# Patient Record
Sex: MALE | Race: BLACK OR AFRICAN AMERICAN | NOT HISPANIC OR LATINO | Employment: FULL TIME | URBAN - METROPOLITAN AREA
[De-identification: names, ages, dates, MRNs, and addresses within clinical notes are randomized per-mention and may not be internally consistent; named-entity substitution may affect disease eponyms.]

---

## 2023-08-30 PROBLEM — K21.9 LPRD (LARYNGOPHARYNGEAL REFLUX DISEASE): Status: ACTIVE | Noted: 2023-08-30

## 2023-08-30 PROBLEM — R43.2 LOSS OF TASTE: Status: ACTIVE | Noted: 2023-08-30

## 2023-08-30 PROBLEM — A59.9 TRICHIMONIASIS: Status: ACTIVE | Noted: 2023-08-30

## 2023-08-30 PROBLEM — R09.A2 GLOBUS SENSATION: Status: ACTIVE | Noted: 2023-08-30

## 2023-08-30 PROBLEM — R13.10 DYSPHAGIA: Status: ACTIVE | Noted: 2023-08-30

## 2023-08-30 PROBLEM — R73.03 PRE-DIABETES: Status: ACTIVE | Noted: 2023-08-30

## 2023-08-30 PROBLEM — J95.830 POST-TONSILLECTOMY HEMORRHAGE: Status: ACTIVE | Noted: 2023-08-30

## 2023-08-30 PROBLEM — E03.9 HYPOTHYROIDISM: Status: ACTIVE | Noted: 2023-08-30

## 2023-08-30 PROBLEM — R43.0 LOSS OF SMELL: Status: ACTIVE | Noted: 2023-08-30

## 2023-08-30 PROBLEM — R73.09 ELEVATED GLUCOSE: Status: ACTIVE | Noted: 2023-08-30

## 2023-08-30 PROBLEM — A64 STI (SEXUALLY TRANSMITTED INFECTION): Status: ACTIVE | Noted: 2023-08-30

## 2023-08-30 PROBLEM — R55 SYNCOPE: Status: ACTIVE | Noted: 2023-08-30

## 2023-08-30 PROBLEM — K11.7 XEROSTOMIA DUE TO RADIOTHERAPY: Status: ACTIVE | Noted: 2023-08-30

## 2023-08-30 PROBLEM — E66.811 OBESITY, CLASS I, BMI 30-34.9: Status: ACTIVE | Noted: 2023-08-30

## 2023-08-30 PROBLEM — C78.00: Status: ACTIVE | Noted: 2023-08-30

## 2023-08-30 PROBLEM — E78.5 HYPERLIPIDEMIA: Status: ACTIVE | Noted: 2023-08-30

## 2023-08-30 PROBLEM — R91.1 PULMONARY NODULE: Status: ACTIVE | Noted: 2023-08-30

## 2023-08-30 PROBLEM — R10.9 ABDOMINAL PAIN: Status: ACTIVE | Noted: 2023-08-30

## 2023-08-30 PROBLEM — E88.89 STEATOSIS (MULTI): Status: ACTIVE | Noted: 2023-08-30

## 2023-08-30 PROBLEM — C32.1 MALIGNANT NEOPLASM OF SUPRAGLOTTIS (MULTI): Status: ACTIVE | Noted: 2023-08-30

## 2023-08-30 PROBLEM — R79.89 ELEVATED TSH: Status: ACTIVE | Noted: 2023-08-30

## 2023-08-30 PROBLEM — R49.0 HOARSENESS, PERSISTENT: Status: ACTIVE | Noted: 2023-08-30
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
## 2023-08-30 PROBLEM — S43.409A SPRAIN OF SHOULDER: Status: ACTIVE | Noted: 2023-08-30

## 2023-08-30 PROBLEM — J34.3 HYPERTROPHY OF NASAL TURBINATES: Status: ACTIVE | Noted: 2023-08-30

## 2023-08-30 PROBLEM — E87.6 HYPOKALEMIA: Status: ACTIVE | Noted: 2023-08-30

## 2023-08-30 PROBLEM — J03.91 RECURRENT TONSILLITIS: Status: ACTIVE | Noted: 2023-08-30

## 2023-08-30 PROBLEM — R06.83 SNORING: Status: ACTIVE | Noted: 2023-08-30

## 2023-08-30 PROBLEM — E66.9 OBESITY, CLASS I, BMI 30-34.9: Status: ACTIVE | Noted: 2023-08-30

## 2023-08-30 PROBLEM — C32.9 LARYNGEAL CANCER (MULTI): Status: ACTIVE | Noted: 2023-08-30

## 2023-08-30 RX ORDER — LEVOTHYROXINE SODIUM 75 UG/1
75 TABLET ORAL DAILY
COMMUNITY
Start: 2022-07-14

## 2023-10-03 ENCOUNTER — TELEPHONE (OUTPATIENT)
Dept: ADMISSION | Facility: HOSPITAL | Age: 53
End: 2023-10-03
Payer: COMMERCIAL

## 2023-10-03 NOTE — TELEPHONE ENCOUNTER
Pt called requesting an update on his [facility] paperwork that was given to the team. Message sent to Dr. Burkitt's team.

## 2023-10-04 ENCOUNTER — HOSPITAL ENCOUNTER (OUTPATIENT)
Dept: RADIOLOGY | Facility: HOSPITAL | Age: 53
Discharge: HOME | End: 2023-10-04
Payer: COMMERCIAL

## 2023-10-04 DIAGNOSIS — C32.1 MALIGNANT NEOPLASM OF SUPRAGLOTTIS (MULTI): ICD-10-CM

## 2023-10-04 PROCEDURE — 71260 CT THORAX DX C+: CPT

## 2023-10-04 PROCEDURE — 71260 CT THORAX DX C+: CPT | Performed by: STUDENT IN AN ORGANIZED HEALTH CARE EDUCATION/TRAINING PROGRAM

## 2023-10-04 PROCEDURE — 2550000001 HC RX 255 CONTRASTS: Performed by: INTERNAL MEDICINE

## 2023-10-04 RX ADMIN — IOHEXOL 75 ML: 350 INJECTION, SOLUTION INTRAVENOUS at 09:59

## 2023-10-06 ENCOUNTER — LAB (OUTPATIENT)
Dept: LAB | Facility: HOSPITAL | Age: 53
End: 2023-10-06
Payer: COMMERCIAL

## 2023-10-13 ENCOUNTER — OFFICE VISIT (OUTPATIENT)
Dept: HEMATOLOGY/ONCOLOGY | Facility: HOSPITAL | Age: 53
End: 2023-10-13
Payer: COMMERCIAL

## 2023-10-13 VITALS
BODY MASS INDEX: 29.41 KG/M2 | RESPIRATION RATE: 17 BRPM | OXYGEN SATURATION: 98 % | HEIGHT: 72 IN | DIASTOLIC BLOOD PRESSURE: 84 MMHG | HEART RATE: 57 BPM | WEIGHT: 217.15 LBS | TEMPERATURE: 97.2 F | SYSTOLIC BLOOD PRESSURE: 132 MMHG

## 2023-10-13 DIAGNOSIS — C32.9 LARYNGEAL CANCER (MULTI): Primary | ICD-10-CM

## 2023-10-13 DIAGNOSIS — Z08 ENCOUNTER FOR FOLLOW-UP SURVEILLANCE OF HEAD AND NECK CANCER: ICD-10-CM

## 2023-10-13 DIAGNOSIS — K11.7 XEROSTOMIA DUE TO RADIOTHERAPY: ICD-10-CM

## 2023-10-13 DIAGNOSIS — Z85.89 ENCOUNTER FOR FOLLOW-UP SURVEILLANCE OF HEAD AND NECK CANCER: ICD-10-CM
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
## 2023-10-13 DIAGNOSIS — C78.00 SECONDARY SQUAMOUS CELL CARCINOMA OF LUNG, UNSPECIFIED LATERALITY (MULTI): ICD-10-CM

## 2023-10-13 DIAGNOSIS — R91.8 MULTIPLE LUNG NODULES ON CT: ICD-10-CM

## 2023-10-13 PROCEDURE — 99215 OFFICE O/P EST HI 40 MIN: CPT | Performed by: STUDENT IN AN ORGANIZED HEALTH CARE EDUCATION/TRAINING PROGRAM

## 2023-10-13 PROCEDURE — 1036F TOBACCO NON-USER: CPT | Performed by: STUDENT IN AN ORGANIZED HEALTH CARE EDUCATION/TRAINING PROGRAM

## 2023-10-13 ASSESSMENT — PAIN SCALES - GENERAL: PAINLEVEL: 0-NO PAIN

## 2023-10-13 NOTE — PROGRESS NOTES
Patient ID: Tia Martinez is a 52 y.o. male.  Diagnosis:  Squamous Cell Carcinoma of supraglottic larynx, P16 Negative  Stagin/2019: cT3 cN0 cMx, Stage III   2020: T0 N0 M1 with mets to lung  Date of Diagnosis: 2019    Providers:  ENT Surgeon: Dr. Luna Mercer  [facility]: Dr. Rachelle Saldana --> Dr. Kyunghee Burkitt, X. Katherine Feng (PA)  RadOnc: Dr. Luigi Bhandari    Prior Therapy  2019 - 2020: Concurrent chemoradiation HD Cisplatin (100mg/m2) x1 and changed to weekly dosing with Cisplatin (40mg/m2) and 70gy RT in 35fx  10/5 - 10/15/2020: 50gy SBRT to left upper lobe lung nodule metastatic from Squamous Cell Carcinoma of Larynx.     Sites of Disease  DEE      ONCOLOGIC HISTORY  - 2019 Presented to Dr. Mercer  - Enrolled on the DRINK study. Cycle 1 of HD cisplatin was complicated by MIRTHA with Creat 2.3 due to dehydration. He received IVF 3x a week in [facility] the week after chemo with good improvement. When he presented for 2nd dose HD Cisplatin his ANC was below  parameters for treatment. Given low ANC and elevated Creatinine after HD Cis, he was changed to weekly dosing which he received on 19 when his counts recovered.   - 20 post treatment PET/CT showed Marked interval decrease in metabolic activity within the larynx consistent with response of patient's known squamous cell carcinoma to treatment. Mild increased  activity is seen in the area of the vocal cords which could be related to phonation versus residual tumor. Resolution of previously seen hypermetabolic cervical lymphadenopathy. Stable pulmonary nodule within the left upper lobe with mild metabolic activity,  nonspecific.      Lung nodule:   - 8/3/20 CT chest w/ contrast: showed interval increase in size of the pulmonary nodule in the left upper lobe, which now demonstrates central cavitation. Findings are concerning for potential metastatic disease.  - 20: CT guided lung biopsy that confirmed mets.   - 10/15/20: Completed  50 gy SBRT to nodules.    - 12/1/20 CT chest w/ con reviewed with Dr. Bhandari. There was  Interval increase in size of a left upper lobe cavitating pulmonary nodule with now abutment of the adjacent pleura. After discussion with Rad onc, it maybe radiation related fibrosis and it  was recommended for PET   - 1/11/21 PET/CT showed continued increase activity in left upper lobe mass w/ significant interval increase in size and hypermetabolic activity  - 3/1/21: Left upper lobe wedge resection. Pathology did not show evidence of cancer--only inflammation. Discussed the results with the  patient and plan for surveillance  - Surveillance CT Chest has been stable since     Past Medical History:   Past Medical History:  No date: Fracture of unspecified part of scapula, unspecified   shoulder, initial encounter for closed fracture      Comment:  Scapula fracture  10/19/2022: Personal history of malignant neoplasm of larynx      Comment:  History of malignant neoplasm of larynx   Surgical History:    Past Surgical History:   Procedure Laterality Date    CT GUIDED PERCUTANEOUS BIOPSY LUNG  8/25/2020    CT GUIDED PERCUTANEOUS BIOPSY LUNG 8/25/2020 [facility]    OTHER SURGICAL HISTORY  03/08/2021    Lung wedge resection    TONSILLECTOMY  11/15/2016    Tonsillectomy With Adenoidectomy      Family History:    Family History   Problem Relation Name Age of Onset    Heart attack Mother      Thyroid disease Mother      Diabetes Father      Lung cancer Father      Heart failure Brother      Diabetes Mother's Sister       Family Oncology History:    Cancer-related family history includes Lung cancer in his father.  Social History:    Social History     Tobacco Use    Smoking status: Never    Smokeless tobacco: Never   Substance Use Topics    Alcohol use: Never    Drug use: Never          Subjective   Chief Complaint: Squamous Cell Carcinoma of supraglottic larynx with mets to lung    HPI  Interval History  Tia Martinez is a 52 y.o.  male w/ hisotory of supraglottic larynx cancer, s/p CRT with HD Cisplatin then transition to weekly Cisplatin dosing after 1 cycle of HD Cis, completed  CRT on 1/30/20. Post treatment PET/CT showed good response in head & neck with mild metabolic activity in DEE. Later found to have lung mets confirmed by biopsy and completed SBRT to DEE lung mass in Oct 2020. However continued to have increased metabolic activity on PET/CT and completed DEE wedge resection on 3/1/21 with path showing only inflammation. Patient has since been followed with surveillance CT chest with contrast.      Patient is 2.5 years from last treatment, he presents today for follow up and review of surveillance CT Chest.     Patient reports he has been doing well.  He has some muscle tightness in his neck when he eats or yawn occasionally, this is chronic.  He continues to have dry mouth that is well managed with water, taste is back to normal. He has lost weight since last visit. Report his diet is still not very healthy.      Reviewed 10/4/23 CT Chest with patient and his wife in clinic today. Scans shows stable post surgical changes to DEE and stable scattered nodules in bilateral lungs. No new nodules or enlarge LN in chest.      ROS  Review of Systems   Constitutional:  Negative for chills and fever.   HENT:   Negative for hearing loss, lump/mass, mouth sores, nosebleeds, sore throat, tinnitus and trouble swallowing.    Respiratory:  Negative for cough and shortness of breath.    Cardiovascular:  Negative for chest pain and leg swelling.   Gastrointestinal:  Negative for abdominal pain, constipation, diarrhea, nausea and vomiting.   Musculoskeletal:  Negative for arthralgias.   Skin:  Negative for rash.   Neurological:  Negative for headaches, light-headedness and numbness.       Allergies  No Known Allergies     Medications  Current Outpatient Medications   Medication Instructions    levothyroxine (SYNTHROID, LEVOXYL) 75 mcg, oral, Daily,  "Take on empty stomach          Objective   VS: /84   Pulse 57   Temp 36.2 °C (97.2 °F)   Resp 17   Ht 1.829 m (6' 0.01\")   Wt 98.5 kg (217 lb 2.5 oz)   SpO2 98%   BMI 29.44 kg/m²   Weight:   Wt Readings from Last 5 Encounters:   10/13/23 98.5 kg (217 lb 2.5 oz)   05/17/23 103 kg (227 lb 4 oz)   01/19/23 104 kg (228 lb 8 oz)   01/18/23 103 kg (226 lb)   10/19/22 101 kg (222 lb)         Physical Exam  Constitutional:       Appearance: Normal appearance. He is well-developed.   HENT:      Head: Normocephalic and atraumatic.      Right Ear: External ear normal. No tenderness.      Left Ear: External ear normal. No tenderness.      Nose: Nose normal.      Mouth/Throat:      Mouth: Mucous membranes are moist. No injury or oral lesions.      Tongue: No lesions.      Pharynx: Oropharynx is clear.   Eyes:      Extraocular Movements: Extraocular movements intact.      Conjunctiva/sclera: Conjunctivae normal.      Pupils: Pupils are equal, round, and reactive to light.   Neck:      Thyroid: No thyroid mass.   Cardiovascular:      Rate and Rhythm: Normal rate and regular rhythm.   Pulmonary:      Effort: Pulmonary effort is normal. No respiratory distress.      Breath sounds: Normal breath sounds.   Abdominal:      General: Bowel sounds are normal. There is no distension or abdominal bruit.      Palpations: Abdomen is soft. There is no mass.      Tenderness: There is no abdominal tenderness.   Musculoskeletal:         General: Normal range of motion.      Cervical back: Normal range of motion and neck supple. No signs of trauma. Normal range of motion.      Right lower leg: No edema.      Left lower leg: No edema.   Lymphadenopathy:      Cervical: No cervical adenopathy.      Upper Body:      Right upper body: No axillary adenopathy.      Left upper body: No axillary adenopathy.   Skin:     General: Skin is warm and dry.      Findings: No lesion or rash.   Neurological:      General: No focal deficit present.      " Mental Status: He is alert and oriented to person, place, and time.      Gait: Gait is intact.   Psychiatric:         Mood and Affect: Mood and affect normal.         Behavior: Behavior is cooperative.           Diagnostic Results     Labs  Labs from 9/5/23 reviewed and are at baseline.                          Image  10/4/2023 CT chest w IV contrast  Impression:   1.  Evolving postsurgical/post radiation changes of the upper lobe, with mild decrease in conspicuity of soft tissue nodularity along the surgical suture margins as described above, when compared to recent prior CT from 04/10/2023. Continued attention on follow-up imaging is recommended.   2. Few small bilateral noncalcified pulmonary nodules, which are stable dating back to 10/01/2021. No new or enlarging pulmonary nodules.  3. Additional stable findings as above.        Assessment/Plan   ASSESSMENT  Tia Martinez is a 52 y.o. male w/ stage III cT3 cN0 cMx supraglottic larynx cancer, PDL-1 CPS 5 receiving definitive chemoradiation with high dose cisplatin 100mg/m2  started on 12/11/19. Transition to weekly Cisplatin dosing after 1 cycle of HD Cis, completed CRT on 1/30/20. Enrolled on DRINK study, AKA Gatorade study. Post treatment PET/CT showed good response in head & neck with mild metabolic activity in DEE. Later  found to have DEE lung mets confirmed by biopsy and completed SBRT to DEE lung mass on 10/15/20. However continued to have increased metabolic activity on PET/CT and completed DEE wedge resection  on 3/1/21 with path showing only inflammation. Patient has since been followed with surveillance CT chest with contrast.      # Supraglottic larynx cancer with mets to lung  - Reviewed 4/10/23 CT Chest with patient, continue to show stability in lung nodules. He's eating/drinking with no issues, he's in his usual state of health. He will have surveillance scan again in 6 months.     # Steatosis  - Notes on 10/10/22 CT Chest. Patient endorse eating  greasy meals, drinking 12 pack of beer  - Advised pt to establish PCP to discuss lifestyle modifications decrease high fat food and incorporate vegetables and protein in to diet. Decrease alcohol intake.  - 10/12/23: Patient has loss weight but continue to endorse not eating very health and still has not established with a PCP. Encouraged patient to go on 's website to schedule with a PCP online.      PLAN  -- CT chest w/ contrast in 6 months on 4/5/24  -- RTC with [facility] on 4/9/24  -- Continue follow up with Dr. Mercer (ENT), next FUV 1/10/24  -- Patient to establish with a PCP

## 2023-10-13 NOTE — LETTER
October 13, 2023     Patient: Tia Martinez   YOB: 1970   Date of Visit: 10/13/2023       To Whom It May Concern:    Tia Martinez was seen in my clinic on 10/13/2023. Please excuse Tia for his absence from work on this day to make the appointment.    If you have any questions or concerns, please don't hesitate to call.         Sincerely,         Yair Novoa PA-C        CC: No Recipients

## 2023-10-16 ASSESSMENT — ENCOUNTER SYMPTOMS
LEG SWELLING: 0
SHORTNESS OF BREATH: 0
HEADACHES: 0
COUGH: 0
TROUBLE SWALLOWING: 0
ABDOMINAL PAIN: 0
SORE THROAT: 0
NAUSEA: 0
VOMITING: 0
DIARRHEA: 0
LIGHT-HEADEDNESS: 0
FEVER: 0
CHILLS: 0
ARTHRALGIAS: 0
NUMBNESS: 0
CONSTIPATION: 0

## 2023-11-30 ENCOUNTER — HOSPITAL ENCOUNTER (EMERGENCY)
Facility: HOSPITAL | Age: 53
Discharge: HOME | End: 2023-11-30
Payer: COMMERCIAL

## 2023-11-30 ENCOUNTER — APPOINTMENT (OUTPATIENT)
Dept: RADIOLOGY | Facility: HOSPITAL | Age: 53
End: 2023-11-30
Payer: COMMERCIAL

## 2023-11-30 VITALS
RESPIRATION RATE: 18 BRPM | OXYGEN SATURATION: 98 % | HEART RATE: 59 BPM | SYSTOLIC BLOOD PRESSURE: 121 MMHG | WEIGHT: 224 LBS | HEIGHT: 72 IN | TEMPERATURE: 97.9 F | BODY MASS INDEX: 30.34 KG/M2 | DIASTOLIC BLOOD PRESSURE: 86 MMHG

## 2023-11-30 DIAGNOSIS — M25.532 WRIST PAIN, ACUTE, LEFT: Primary | ICD-10-CM

## 2023-11-30 PROCEDURE — 99283 EMERGENCY DEPT VISIT LOW MDM: CPT | Mod: 25

## 2023-11-30 PROCEDURE — 99284 EMERGENCY DEPT VISIT MOD MDM: CPT

## 2023-11-30 PROCEDURE — 73110 X-RAY EXAM OF WRIST: CPT | Mod: LT

## 2023-11-30 PROCEDURE — 2500000004 HC RX 250 GENERAL PHARMACY W/ HCPCS (ALT 636 FOR OP/ED): Performed by: PHYSICIAN ASSISTANT

## 2023-11-30 PROCEDURE — 73110 X-RAY EXAM OF WRIST: CPT | Mod: LEFT SIDE | Performed by: RADIOLOGY

## 2023-11-30 PROCEDURE — 96372 THER/PROPH/DIAG INJ SC/IM: CPT

## 2023-11-30 RX ORDER — CYCLOBENZAPRINE HCL 10 MG
10 TABLET ORAL 2 TIMES DAILY PRN
Qty: 20 TABLET | Refills: 0 | Status: SHIPPED | OUTPATIENT
Start: 2023-11-30 | End: 2023-12-10

## 2023-11-30 RX ORDER — NAPROXEN 500 MG/1
500 TABLET ORAL
Qty: 30 TABLET | Refills: 0 | Status: SHIPPED | OUTPATIENT
Start: 2023-11-30 | End: 2023-12-15

## 2023-11-30 RX ORDER — KETOROLAC TROMETHAMINE 30 MG/ML
30 INJECTION, SOLUTION INTRAMUSCULAR; INTRAVENOUS ONCE
Status: COMPLETED | OUTPATIENT
Start: 2023-11-30 | End: 2023-11-30

## 2023-11-30 RX ADMIN — KETOROLAC TROMETHAMINE 30 MG: 30 INJECTION, SOLUTION INTRAMUSCULAR at 12:15

## 2023-11-30 ASSESSMENT — PAIN SCALES - GENERAL: PAINLEVEL_OUTOF10: 7

## 2023-11-30 ASSESSMENT — COLUMBIA-SUICIDE SEVERITY RATING SCALE - C-SSRS
2. HAVE YOU ACTUALLY HAD ANY THOUGHTS OF KILLING YOURSELF?: NO
6. HAVE YOU EVER DONE ANYTHING, STARTED TO DO ANYTHING, OR PREPARED TO DO ANYTHING TO END YOUR LIFE?: NO
1. IN THE PAST MONTH, HAVE YOU WISHED YOU WERE DEAD OR WISHED YOU COULD GO TO SLEEP AND NOT WAKE UP?: NO

## 2023-11-30 ASSESSMENT — PAIN - FUNCTIONAL ASSESSMENT: PAIN_FUNCTIONAL_ASSESSMENT: 0-10

## 2023-11-30 NOTE — ED PROVIDER NOTES
EMERGENCY MEDICINE EVALUATION NOTE    History of Present Illness     Chief Complaint:   Chief Complaint   Patient presents with    Wrist Pain       HPI: Tia Martinez is a 53 y.o. male presents with a chief complaint of left wrist pain.  Patient states that he has had pain with movement ever since yesterday.  Patient reports that he has decreased flexion and extension secondary to pain.  Reports no significant injury.  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  Patient has a previous surgeries or fractures.  Patient states that the pain starts in his elbow and radiates down the wrist.  Patient denies any loss of neurovascular status to the left upper extremity.  Patient has not been taking anything at home for symptoms.  Patient denies any associated chest pain or shortness of breath.  Patient denies any significant swelling of the left upper extremity denies any redness or warmth to left wrist joint.    Previous History     Past Medical History:   Diagnosis Date    Fracture of unspecified part of scapula, unspecified shoulder, initial encounter for closed fracture     Scapula fracture    Personal history of malignant neoplasm of larynx 10/19/2022    History of malignant neoplasm of larynx     Past Surgical History:   Procedure Laterality Date    CT GUIDED PERCUTANEOUS BIOPSY LUNG  8/25/2020    CT GUIDED PERCUTANEOUS BIOPSY LUNG 8/25/2020 [facility]    OTHER SURGICAL HISTORY  03/08/2021    Lung wedge resection    TONSILLECTOMY  11/15/2016    Tonsillectomy With Adenoidectomy     Social History     Tobacco Use    Smoking status: Never    Smokeless tobacco: Never   Substance Use Topics    Alcohol use: Never    Drug use: Never     Family History   Problem Relation Name Age of Onset    Heart attack Mother      Thyroid disease Mother      Diabetes Father      Lung cancer Father      Heart  failure Brother      Diabetes Mother's Sister       No Known Allergies  Current Outpatient Medications   Medication Instructions    cyclobenzaprine (FLEXERIL) 10 mg, oral, 2 times daily PRN    levothyroxine (SYNTHROID, LEVOXYL) 75 mcg, oral, Daily, Take on empty stomach    naproxen (NAPROSYN) 500 mg, oral, 2 times daily with meals       Physical Exam     Appearance: Alert, oriented , cooperative     Skin: Intact,  dry skin, no lesions, rash, petechiae or purpura.      Eyes: PERRLA, EOMs intact,  Conjunctiva pink      ENT: Hearing grossly intact.      Neck: Supple. Trachea at midline.      Pulmonary: Clear bilaterally. No rales, rhonchi or wheezing. No accessory muscle use or stridor.     Cardiac: Normal rate and rhythm without murmur     Abdomen: Soft, nontender, active bowel sounds.     Musculoskeletal: Decreased range of motion of left wrist.  No obvious tenderness on examination.  Unable to perform Phalen's test due to pain.  Neurovascular intact in left upper extremity with strong pulses and prescribe refill.     Neurological:Cranial nerves II through XII are grossly intact, normal sensation, no weakness, no focal findings identified.     Results   Labs Reviewed - No data to display  XR wrist left 3+ views   Final Result   1. No evidence of acute fracture or dislocation.        MACRO:   None.        Signed by: Phong Adair 11/30/2023 12:14 PM   Dictation workstation:   [id number]            ED Course & Medical Decision Making     Medications   ketorolac (Toradol) injection 30 mg (30 mg intramuscular Given 11/30/23 1215)     Heart Rate:  [59]   Temp:  [36.6 °C (97.9 °F)]   Resp:  [18]   BP: (121)/(86)   Height:  [182.9 cm (6')]   Weight:  [102 kg (224 lb)]   SpO2:  [98 %]    Diagnoses as of 11/30/23 1310   Wrist pain, acute, left     Tia Martinez is a 53 y.o. male presents with a chief complaint of left wrist pain.  Patient states that he has had pain with movement ever since yesterday.  Patient reports that  he has decreased flexion and extension secondary to pain.  Reports no significant injury.  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  Patient has a previous surgeries or fractures.  Patient states that the pain starts in his elbow and radiates down the wrist.  Patient denies any loss of neurovascular status to the left upper extremity.  Patient has not been taking anything at home for symptoms.  Patient denies any associated chest pain or shortness of breath.  Patient had work-up today which included x-rays.  X-rays did not show any acute fracture dislocations of the left wrist.  Plan of care discussed with the patient.  Patient did receive IM Toradol in the ED which did improve his symptoms.  Patient be discharged home at this time with naproxen and Flexeril at home for his pain.  Patient requested light duty work note for the next 2 days which she was given.  He was encouraged to follow-up with primary care provider or return here immediately with any worsening symptoms.    Procedures   Procedures    Diagnosis     1. Wrist pain, acute, left        Disposition   Discharged    ED Prescriptions       Medication Sig Dispense Start Date End Date Auth. Provider    naproxen (Naprosyn) 500 mg tablet Take 1 tablet (500 mg) by mouth 2 times a day with meals for 15 days. 30 tablet 11/30/2023 12/15/2023 James Carrillo PA-C    cyclobenzaprine (Flexeril) 10 mg tablet Take 1 tablet (10 mg) by mouth 2 times a day as needed for muscle spasms for up to 10 days. 20 tablet 11/30/2023 12/10/2023 James Carrillo PA-C            Disclaimer: This note was dictated by speech recognition. Minor errors in transcription may be present. Please call if questions.       James Carrillo PA-C  11/30/23 [id number]

## 2023-11-30 NOTE — ED TRIAGE NOTES
Pt arrived via private vehicle from home with chief c/o of L wrist pain. 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 Pt states he has decreased ROM and his hand feels numb, he cannot bend wrist. Pt used icy hot at home with no relief.

## 2023-11-30 NOTE — Clinical Note
Tia Martinez was seen and treated in our emergency department on 11/30/2023.  He may return to work on 12/04/2023.  Please allow patient to have a few days with light duty.     If you have any questions or concerns, please don't hesitate to call.      James Carrillo PA-C

## 2024-01-02 NOTE — PROGRESS NOTES
Cancer follow up  TSH: Due now  Chest: Due 4/24    Chief Complaint   Patient presents with    Follow-up     HPI:  Tia Martinez is a 53 y.o. 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 He completed chemo/xrt on 1/30/2020. He completed lung SBRT on 10/15/20 with Dr. Bhandari. He then underwent left VATS which was negative for persistent cancer. Last seen 9/23. No breathing issues. Maintains a good diet. No weight loss. His voice remains raspy. No neck masses.  Due for TSH today.  Mild xerostomia, mild dysphagia.  Denies odynophagia or otalgia. Tolerating a regular diet.  Denies weight loss.  No fevers/chills.  No night sweats.    History:  Dx: T3N0M0 SCCa of the right supraglottis  Dx2: T0N0M1 SCCa of the left lung s/p SBRT 2020  10/19: Hoarseness & dysphagia, scope exam with supraglottic mass  10/30/19: +right supraglottic mass with paraglottic space involvement, no pathologic lymphadenopathy  10/30/19: MBS w/speech +dysphagia with aspiration  11/1/19: S/p triple endoscopy and biopsy +SCCa  11/21/19: CT chest w/o contrast which shows several bilateral pulmonary nodules. A solid 10 mm solid pulmonary  nodule at the lateral aspect of the right upper lobe is concerning for metastatic disease. Further evaluation and attention on short-term follow-up is recommended.   11/27/19: PET/CT which shows thickening of the right vocal cord with significantly increased hypermetabolic activity (max SUV 14.8) extending into the epiglottis with (max SUV 5.3). There is a 0.7 cm right cervical chain zone 2B lymph node with mild hypermetabolic activity (max SUV 3.5). Left upper lobe nodular density measuring 0.8 cm shows mild  hypermetabolic activity (max SUV 2.2).  12/11/19: Started chemo/xrt with Dr. Saldana and Dr. Bhandari   1/30/20: Completed chemo/xrt   4/30/20: 3 month post treatment PET and repeat CT chest shows excellent response with mild FDG avidity at the larynx specifically the  vocal folds, resolution of his cervical lymphadenopathy, stable 7mm left lung nodule.  6/24/20: TSH 2.15  7/17/20: +COVID   8/3/20: Repeat CT chest this shows a left upper lobe nodule which demonstrates interval increase in size and central cavitation. The nodule now measures 1.5 x 1.3 cm, was 0.8 x 0.7 cm. There are multiple additional pulmonary nodules  which are stable in size and appearance compared to the prior exam. For instance, there is a pleural-based nodule in the right lower lobe measuring 5 mm -4 mm nodule in the right lower lobe.   8/25/20: CT guided lung biopsy of the left lung. Path + for SCCa  9/4/20: PET scan +lung nodule   10/15/20: Completed SBRT to L lung with Dr. Bhandari  12/1/20: CT chest which shows interval increase in the size of the left upper lobe lung nodule with now abutment of the adjacent pleura.   1/11/21: PET scan with increase in the DEE lesion, SUV~5, laryngeal SUV5 possibly physiologic  3/1/21: S/p L VATs DEE wedge resection, mediastinal LN dissection, bronch with Dr. Rankin. Path negative for cancer and + reactive changes from prior treatment.   6/10/21: CT chest w/ contrast- stable, no new nodules  1/12/22: CT chest - stable, no new nodules  4/22: CT chest - stable, no new nodules  4/22: TSH- hypothyroid at 5.51.   10/22: TSH 4.8 but T4 normal  10/22: CT chest - stable, no new nodules  1/18: Weight stable  4/11/23: CT chest- stable, no new nodules  10/23: CT chest - stable, no new nodules     SH:  Tob: +current smoker. Quit   ETOH: Moderate/social   Here with wife    ROS:  Review of Systems   Constitutional:  Negative for appetite change, chills, fatigue, fever and unexpected weight change.   HENT:  Negative for dental problem, drooling, ear pain, facial swelling, hearing loss, mouth sores, sore throat, tinnitus, trouble swallowing and voice change.    Respiratory:  Negative for cough, shortness of breath and stridor.    Gastrointestinal:  Negative for nausea and vomiting.    Musculoskeletal:  Negative for neck pain.   Hematological:  Negative for adenopathy.   All other systems reviewed and are negative.     PE:  ENT Physical Exam  Constitutional  Appearance: patient appears well-developed, patient is cooperative;  Communication/Voice: Communication comments: +hoarseness (stable), +breathy (stable)  Head and Face  Appearance: head appears normal and face appears normal;  Salivary: glands normal;  Ear  Auricles: right auricle normal; left auricle normal;  Ear Canals: right ear canal normal; left ear canal normal;  Tympanic Membranes: right tympanic membrane normal;  Ear comments: Left TM with small area of weakness but intact  Nose  External Nose: nares patent bilaterally; external nose normal;  Internal Nose: nasal mucosa normal; septum normal;  Oral Cavity/Oropharynx  Lips: normal;  Gums: gingiva normal;  Tongue: normal;  Oral mucosa: normal;  OC/OP comments: Uvula deviated to the right s/p tonsillectomy with scars present from tonsillectomy bilaterally  Neck  Neck: radiation changes present;  Thyroid: thyroid normal;  Respiratory  Inspection: breathing unlabored;  Cardiovascular  Inspection: extremities are warm and well perfused;  Neurovestibular  Mental Status: alert and oriented;  Psychiatric: mood normal; affect is appropriate;  Cranial Nerves: cranial nerves intact;       Procedures   PROCEDURE NOTE:  Recommended flexible nasopharyngoscopy.  Risks, benefits, personnel and alternatives were explained.  The patient wished to proceed.  S/he was re-identified.  PROCEDURE:  Flexible nasopharyngoscopy  PREOPERATIVE DIAGNOSIS: Laryngeal cancer surveillance  POSTOPERATIVE DIAGNOSIS: Retroflexed epiglottis, +anterior glottic web, no recurrent masses  INDICATIONS: Inability to tolerate mirror exam  PROCEDURE NOTE:  Recommended flexible nasopharyngoscopy.  Risks, benefits, personnel and alternatives were explained.  The patient wished to proceed.  S/he was re-identified.  FINDINGS:  The  nasopharynx and oropharynx were normal without any lesions or masses visualized.  No masses or lesions were visualized at the base of tongue, vallecula, epiglottis, aryepiglottic folds, pyriform sinuses, and lateral pharyngeal walls.  See findings above.  True vocal fold movement was normal.  The patient's airway was widely patent with no evidence of obstruction.  Patient tolerated the procedure well, and there were no complications.     ASSESSMENT AND PLAN:  Problem List Items Addressed This Visit       Dysphagia    Current Assessment & Plan     Chronic, adverse effect of radiation  Continue current diet         Hoarseness, persistent    Hypothyroidism - Primary    Current Assessment & Plan     TSH due today, ordered will call with results         Relevant Orders    Thyroid Stimulating Hormone    Laryngeal cancer (CMS/HCC)    Malignant neoplasm of supraglottis (CMS/HCC)    Current Assessment & Plan     No evidence of disease in the larynx  Endoscopy negative today  Follow up in 6 months  Hoarseness is stable  Dysphagia is stable         Metastatic squamous cell carcinoma to lung (CMS/HCC)    Current Assessment & Plan     Managed by med onc  Stable CT chest 10/23  Plan for repeat CT chest 4/23         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 Luna Mercer MD.     All medical record entries made by the Scribe were at my direction and personally dictated by me, Dr. Luna Mercer. I have reviewed the chart and agree that the record accurately reflects my personal performance of the history, physical exam, discussion and plan.

## 2024-01-10 ENCOUNTER — OFFICE VISIT (OUTPATIENT)
Dept: OTOLARYNGOLOGY | Facility: HOSPITAL | Age: 54
End: 2024-01-10
Payer: COMMERCIAL

## 2024-01-10 VITALS — TEMPERATURE: 97.3 F | BODY MASS INDEX: 28.39 KG/M2 | WEIGHT: 209.6 LBS | HEIGHT: 72 IN

## 2024-01-10 DIAGNOSIS — E03.9 ACQUIRED HYPOTHYROIDISM: Primary | ICD-10-CM

## 2024-01-10 DIAGNOSIS — C32.1 MALIGNANT NEOPLASM OF SUPRAGLOTTIS (MULTI): ICD-10-CM

## 2024-01-10 DIAGNOSIS — C32.9 LARYNGEAL CANCER (MULTI): ICD-10-CM
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
## 2024-01-10 DIAGNOSIS — K11.7 XEROSTOMIA DUE TO RADIOTHERAPY: ICD-10-CM

## 2024-01-10 DIAGNOSIS — C78.00 SECONDARY SQUAMOUS CELL CARCINOMA OF LUNG, UNSPECIFIED LATERALITY (MULTI): ICD-10-CM

## 2024-01-10 DIAGNOSIS — R13.12 OROPHARYNGEAL DYSPHAGIA: ICD-10-CM

## 2024-01-10 DIAGNOSIS — R49.0 HOARSENESS, PERSISTENT: ICD-10-CM

## 2024-01-10 PROBLEM — J03.91 RECURRENT TONSILLITIS: Status: RESOLVED | Noted: 2023-08-30 | Resolved: 2024-01-10

## 2024-01-10 PROBLEM — J95.830 POST-TONSILLECTOMY HEMORRHAGE: Status: RESOLVED | Noted: 2023-08-30 | Resolved: 2024-01-10

## 2024-01-10 PROCEDURE — 92511 NASOPHARYNGOSCOPY: CPT | Performed by: OTOLARYNGOLOGY

## 2024-01-10 PROCEDURE — 1036F TOBACCO NON-USER: CPT | Performed by: OTOLARYNGOLOGY

## 2024-01-10 PROCEDURE — 99214 OFFICE O/P EST MOD 30 MIN: CPT | Performed by: OTOLARYNGOLOGY

## 2024-01-10 RX ORDER — ACETAMINOPHEN 500 MG
1000 TABLET ORAL EVERY 6 HOURS PRN
COMMUNITY
Start: 2023-09-06

## 2024-01-10 ASSESSMENT — ENCOUNTER SYMPTOMS
APPETITE CHANGE: 0
VOICE CHANGE: 0
CHILLS: 0
NAUSEA: 0
ADENOPATHY: 0
COUGH: 0
FATIGUE: 0
SHORTNESS OF BREATH: 0
NECK PAIN: 0
TROUBLE SWALLOWING: 0
UNEXPECTED WEIGHT CHANGE: 0
STRIDOR: 0
FEVER: 0
FACIAL SWELLING: 0
VOMITING: 0
SORE THROAT: 0

## 2024-01-10 ASSESSMENT — PATIENT HEALTH QUESTIONNAIRE - PHQ9
SUM OF ALL RESPONSES TO PHQ9 QUESTIONS 1 & 2: 0
1. LITTLE INTEREST OR PLEASURE IN DOING THINGS: NOT AT ALL
2. FEELING DOWN, DEPRESSED OR HOPELESS: NOT AT ALL

## 2024-01-10 NOTE — ASSESSMENT & PLAN NOTE
No evidence of disease in the larynx  Endoscopy negative today  Follow up in 6 months  Hoarseness is stable  Dysphagia is stable

## 2024-01-25 ENCOUNTER — LAB (OUTPATIENT)
Dept: LAB | Facility: LAB | Age: 54
End: 2024-01-25
Payer: COMMERCIAL

## 2024-01-25 ENCOUNTER — OFFICE VISIT (OUTPATIENT)
Dept: PRIMARY CARE | Facility: CLINIC | Age: 54
End: 2024-01-25
Payer: COMMERCIAL

## 2024-01-25 VITALS
BODY MASS INDEX: 28.7 KG/M2 | TEMPERATURE: 97.9 F | DIASTOLIC BLOOD PRESSURE: 76 MMHG | HEART RATE: 60 BPM | OXYGEN SATURATION: 97 % | SYSTOLIC BLOOD PRESSURE: 115 MMHG | WEIGHT: 211.9 LBS | HEIGHT: 72 IN

## 2024-01-25 DIAGNOSIS — E03.9 ACQUIRED HYPOTHYROIDISM: ICD-10-CM

## 2024-01-25 DIAGNOSIS — Z11.3 SCREENING FOR STD (SEXUALLY TRANSMITTED DISEASE): ICD-10-CM

## 2024-01-25 DIAGNOSIS — Z11.3 SCREENING FOR STD (SEXUALLY TRANSMITTED DISEASE): Primary | ICD-10-CM

## 2024-01-25 LAB
HERPES SIMPLEX VIRUS 1 IGG: 7.1 INDEX
HERPES SIMPLEX VIRUS 2 IGG: >8 INDEX
HIV 1+2 AB+HIV1 P24 AG SERPL QL IA: NONREACTIVE
TREPONEMA PALLIDUM IGG+IGM AB [PRESENCE] IN SERUM OR PLASMA BY IMMUNOASSAY: NONREACTIVE
TSH SERPL-ACNC: 3.92 MIU/L (ref 0.44–3.98)

## 2024-01-25 PROCEDURE — 86696 HERPES SIMPLEX TYPE 2 TEST: CPT

## 2024-01-25 PROCEDURE — 86780 TREPONEMA PALLIDUM: CPT

## 2024-01-25 PROCEDURE — 86695 HERPES SIMPLEX TYPE 1 TEST: CPT

## 2024-01-25 PROCEDURE — 86694 HERPES SIMPLEX NES ANTBDY: CPT

## 2024-01-25 PROCEDURE — 1036F TOBACCO NON-USER: CPT

## 2024-01-25 PROCEDURE — 87389 HIV-1 AG W/HIV-1&-2 AB AG IA: CPT

## 2024-01-25 PROCEDURE — 84443 ASSAY THYROID STIM HORMONE: CPT

## 2024-01-25 PROCEDURE — 36415 COLL VENOUS BLD VENIPUNCTURE: CPT

## 2024-01-25 PROCEDURE — 99213 OFFICE O/P EST LOW 20 MIN: CPT

## 2024-01-25 ASSESSMENT — ENCOUNTER SYMPTOMS
DEPRESSION: 0
OCCASIONAL FEELINGS OF UNSTEADINESS: 0
LOSS OF SENSATION IN FEET: 0

## 2024-01-25 ASSESSMENT — PAIN SCALES - GENERAL: PAINLEVEL: 0-NO PAIN

## 2024-01-25 NOTE — PROGRESS NOTES
Subjective   Patient ID: Tia Martinez is a 53 y.o. male who presents for Appointment Request (Request STD test). Pt accompanied by his wife.     HPI     #STD test   - Wife was tested positive for HSV type 2 (was c/o itching and scar in the genital area)   - Pt denies any sx (no itching, discharge, pain)   - Last STD test 5 years ago, was negative  - Does not use condom   - Sexually active with his wife, denies other partner     Review of Systems  12pt ROS negative except as mentioned in HPI    Objective   /76 (BP Location: Right arm, Patient Position: Sitting, BP Cuff Size: Adult)   Pulse 60   Temp 36.6 °C (97.9 °F) (Temporal)   Ht 1.829 m (6')   Wt 96.1 kg (211 lb 14.4 oz)   SpO2 97%   BMI 28.74 kg/m²     Physical Exam  Constitutional:       Appearance: Normal appearance.   Cardiovascular:      Rate and Rhythm: Normal rate.   Pulmonary:      Effort: Pulmonary effort is normal.   Neurological:      Mental Status: He is oriented to person, place, and time.     Assessment/Plan     Tia Martinez is a 53 y.o. male who presents for STD screening, accompanied by his wife. Wife was recently positive for HSV 2. Pt is currently asymptomatic and denies previous genital sores/ulcer or other symptoms.     #STD screenig ordered   - Counseled safe sex practice and recommended use of condom  - Discussed the course of HSV and little utility of HSV testing but patient requested it  - G/C test which was not covered by insurance)    Problem List Items Addressed This Visit    None  Visit Diagnoses         Codes    Screening for STD (sexually transmitted disease)    -  Primary Z11.3    Relevant Orders    Syphilis Screen with Reflex    HIV 1/2 Antigen/Antibody Screen with Reflex to Confirmation    HSV Type I/II IgM Antibody    HSV1 IgG and HSV2 IgG          Discussed with Dr Donal Wilks MD  Family Medicine PGY2

## 2024-01-31 LAB — HSV1+2 IGM SER IA-ACNC: 0.48 IV

## 2024-02-07 NOTE — PROGRESS NOTES
I reviewed the resident/fellow's documentation and discussed the patient with the resident/fellow. I agree with the resident/fellow's medical decision making as documented in their note with the exception/addition of the following:  Herpes simplex serology:  Negative IGM;  positive IGG for HSV1 and HSV2, indicating history of infection with both types of herpes simplex at some time in his life, not within past month.     Mayra Mansfield MD

## 2024-04-04 DIAGNOSIS — R91.8 OTHER NONSPECIFIC ABNORMAL FINDING OF LUNG FIELD: Primary | ICD-10-CM

## 2024-04-05 ENCOUNTER — HOSPITAL ENCOUNTER (OUTPATIENT)
Dept: RADIOLOGY | Facility: HOSPITAL | Age: 54
Discharge: HOME | End: 2024-04-05
Payer: COMMERCIAL

## 2024-04-05 DIAGNOSIS — R91.8 MULTIPLE LUNG NODULES ON CT: ICD-10-CM

## 2024-04-05 DIAGNOSIS — C32.9 LARYNGEAL CANCER (MULTI): ICD-10-CM

## 2024-04-05 PROCEDURE — 2550000001 HC RX 255 CONTRASTS: Mod: SE | Performed by: STUDENT IN AN ORGANIZED HEALTH CARE EDUCATION/TRAINING PROGRAM

## 2024-04-05 PROCEDURE — 71260 CT THORAX DX C+: CPT

## 2024-04-05 PROCEDURE — 71260 CT THORAX DX C+: CPT | Performed by: RADIOLOGY

## 2024-04-05 RX ADMIN — IOHEXOL 75 ML: 350 INJECTION, SOLUTION INTRAVENOUS at 11:13

## 2024-04-08 ASSESSMENT — ENCOUNTER SYMPTOMS
TROUBLE SWALLOWING: 0
CONSTIPATION: 0
LIGHT-HEADEDNESS: 0
SORE THROAT: 0
VOMITING: 0
SHORTNESS OF BREATH: 0
LEG SWELLING: 0
ABDOMINAL PAIN: 0
NUMBNESS: 0
CHILLS: 0
FEVER: 0
COUGH: 0
ARTHRALGIAS: 0
HEADACHES: 0
NAUSEA: 0
DIARRHEA: 0

## 2024-04-08 NOTE — PROGRESS NOTES
Patient ID: Tia Maritnez is a 53 y.o. male.  Diagnosis:  Squamous Cell Carcinoma of supraglottic larynx, P16 Negative  Stagin/2019: cT3 cN0 cMx, Stage III   2020: T0 N0 M1 with mets to lung  Date of Diagnosis: 2019    Providers:  ENT Surgeon: Dr. Luna Mercer  MedOn: Dr. Rachelle Saldana --> Dr. Kyunghee Burkitt, X. Katherine Feng ([place])  RadOnc: Dr. Luigi Bhandari    Prior Therapy  2019 - 2020: Concurrent chemoradiation HD Cisplatin (100mg/m2) x1 and changed to weekly dosing with Cisplatin (40mg/m2) and 70gy RT in 35fx  10/5 - 10/15/2020: 50gy SBRT to left upper lobe lung nodule metastatic from Squamous Cell Carcinoma of Larynx.     Sites of Disease  DEE      ONCOLOGIC HISTORY  - 2019 Presented to Dr. Mercer  - Enrolled on the DRINK study. Cycle 1 of HD cisplatin was complicated by MIRTHA with Creat 2.3 due to dehydration. He received IVF 3x a week in [facility] the week after chemo with good improvement. When he presented for 2nd dose HD Cisplatin his ANC was below  parameters for treatment. Given low ANC and elevated Creatinine after HD Cis, he was changed to weekly dosing which he received on 19 when his counts recovered.   - 20 post treatment PET/CT showed Marked interval decrease in metabolic activity within the larynx consistent with response of patient's known squamous cell carcinoma to treatment. Mild increased  activity is seen in the area of the vocal cords which could be related to phonation versus residual tumor. Resolution of previously seen hypermetabolic cervical lymphadenopathy. Stable pulmonary nodule within the left upper lobe with mild metabolic activity,  nonspecific.      Lung nodule:   - 8/3/20 CT chest w/ contrast: showed interval increase in size of the pulmonary nodule in the left upper lobe, which now demonstrates central cavitation. Findings are concerning for potential metastatic disease.  - 20: CT guided lung biopsy that confirmed mets.   - 10/15/20: Completed  50 gy SBRT to nodules.    - 12/1/20 CT chest w/ con reviewed with Dr. Bhandari. There was  Interval increase in size of a left upper lobe cavitating pulmonary nodule with now abutment of the adjacent pleura. After discussion with Rad onc, it maybe radiation related fibrosis and it  was recommended for PET   - 1/11/21 PET/CT showed continued increase activity in left upper lobe mass w/ significant interval increase in size and hypermetabolic activity  - 3/1/21: Left upper lobe wedge resection. Pathology did not show evidence of cancer--only inflammation. Discussed the results with the  patient and plan for surveillance  - Surveillance CT Chest has been stable since     Past Medical History:   Past Medical History:  No date: Fracture of unspecified part of scapula, unspecified   shoulder, initial encounter for closed fracture      Comment:  Scapula fracture  10/19/2022: Personal history of malignant neoplasm of larynx      Comment:  History of malignant neoplasm of larynx   Surgical History:    Past Surgical History:   Procedure Laterality Date    CT GUIDED PERCUTANEOUS BIOPSY LUNG  8/25/2020    CT GUIDED PERCUTANEOUS BIOPSY LUNG 8/25/2020 [facility]    OTHER SURGICAL HISTORY  03/08/2021    Lung wedge resection    TONSILLECTOMY  11/15/2016    Tonsillectomy With Adenoidectomy      Family History:    Family History   Problem Relation Name Age of Onset    Heart attack Mother      Thyroid disease Mother      Diabetes Father      Lung cancer Father      Heart failure Brother      Diabetes Mother's Sister       Family Oncology History:    Cancer-related family history includes Lung cancer in his father.  Social History:    Social History     Tobacco Use    Smoking status: Never    Smokeless tobacco: Never   Substance Use Topics    Alcohol use: Yes          Subjective   Chief Complaint: Squamous Cell Carcinoma of supraglottic larynx with mets to lung    HPI  Interval History  Tia Martinez is a 53 y.o. male w/ hisotory of  supraglottic larynx cancer, s/p CRT with HD Cisplatin then transition to weekly Cisplatin dosing after 1 cycle of HD Cis, completed  CRT on 1/30/20. Post treatment PET/CT showed good response in head & neck with mild metabolic activity in DEE. Later found to have lung mets confirmed by biopsy and completed SBRT to DEE lung mass in Oct 2020. However continued to have increased metabolic activity on PET/CT and completed DEE wedge resection on 3/1/21 with path showing only inflammation. Patient has since been followed with surveillance CT chest with contrast.      Patient is 3 years from last treatment, he presents today for follow up and review of surveillance CT Chest.    He's feeling well overall.   Has stable muscle tightness in the neck when he eats/chews or yawn. This is chronic but occurs infrequently.   He continues to have dry mouth that is well managed with water, taste is back to normal. He has lost weight since last visit.      Reviewed  4/8/24 CT Chest with patient and his wife in clinic today. Scans shows stable post surgical changes to DEE and stable scattered nodules in bilateral lungs. No new nodules or enlarge LN in chest.      ROS  Review of Systems   Constitutional:  Negative for chills and fever.   HENT:   Negative for hearing loss, lump/mass, mouth sores, nosebleeds, sore throat, tinnitus and trouble swallowing.    Respiratory:  Negative for cough and shortness of breath.    Cardiovascular:  Negative for chest pain and leg swelling.   Gastrointestinal:  Negative for abdominal pain, constipation, diarrhea, nausea and vomiting.   Musculoskeletal:  Negative for arthralgias.   Skin:  Negative for rash.   Neurological:  Negative for headaches, light-headedness and numbness.       Allergies  No Known Allergies     Medications  Current Outpatient Medications   Medication Instructions    acetaminophen (TYLENOL) 1,000 mg, oral, Every 6 hours PRN    cyclobenzaprine (FLEXERIL) 10 mg, oral, 2 times daily PRN  "   levothyroxine (SYNTHROID, LEVOXYL) 75 mcg, oral, Daily, Take on empty stomach          Objective   VS: /64 (BP Location: Left arm, Patient Position: Sitting, BP Cuff Size: Large adult)   Pulse 52   Temp 36.1 °C (97 °F)   Resp 18   Ht (S) 1.828 m (5' 11.97\")   Wt 94.8 kg (209 lb)   SpO2 98%   BMI 28.37 kg/m²   Weight:   Wt Readings from Last 5 Encounters:   04/09/24 94.8 kg (209 lb)   01/25/24 96.1 kg (211 lb 14.4 oz)   01/10/24 95.1 kg (209 lb 9.6 oz)   11/30/23 102 kg (224 lb)   10/13/23 98.5 kg (217 lb 2.5 oz)         Physical Exam  Constitutional:       Appearance: Normal appearance. He is well-developed.   HENT:      Head: Normocephalic and atraumatic.      Right Ear: External ear normal. No tenderness.      Left Ear: External ear normal. No tenderness.      Nose: Nose normal.      Mouth/Throat:      Mouth: Mucous membranes are moist. No injury or oral lesions.      Tongue: No lesions.      Pharynx: Oropharynx is clear.   Eyes:      Extraocular Movements: Extraocular movements intact.      Conjunctiva/sclera: Conjunctivae normal.      Pupils: Pupils are equal, round, and reactive to light.   Neck:      Thyroid: No thyroid mass.   Cardiovascular:      Rate and Rhythm: Normal rate and regular rhythm.   Pulmonary:      Effort: Pulmonary effort is normal. No respiratory distress.      Breath sounds: Normal breath sounds.   Abdominal:      General: Bowel sounds are normal. There is no distension or abdominal bruit.      Palpations: Abdomen is soft. There is no mass.      Tenderness: There is no abdominal tenderness.   Musculoskeletal:         General: Normal range of motion.      Cervical back: Normal range of motion and neck supple. No signs of trauma. Normal range of motion.      Right lower leg: No edema.      Left lower leg: No edema.   Lymphadenopathy:      Cervical: No cervical adenopathy.      Upper Body:      Right upper body: No axillary adenopathy.      Left upper body: No axillary " adenopathy.   Skin:     General: Skin is warm and dry.      Findings: No lesion or rash.   Neurological:      General: No focal deficit present.      Mental Status: He is alert and oriented to person, place, and time.      Gait: Gait is intact.   Psychiatric:         Mood and Affect: Mood and affect normal.         Behavior: Behavior is cooperative.           Diagnostic Results     Labs  Labs from 9/5/23 reviewed and are at baseline.   Results from last 7 days   Lab Units 04/09/24  1009   WBC AUTO x10*3/uL 4.0*   HEMOGLOBIN g/dL 14.3   HEMATOCRIT % 42.8   PLATELETS AUTO x10*3/uL 186   NEUTROS ABS x10*3/uL 2.12   LYMPHS ABS AUTO x10*3/uL 1.23   MONOS ABS AUTO x10*3/uL 0.41   EOS ABS AUTO x10*3/uL 0.15   NEUTROS PCT AUTO % 53.4   LYMPHS PCT AUTO % 31.1   MONOS PCT AUTO % 10.4   EOS PCT AUTO % 3.8      Results from last 7 days   Lab Units 04/09/24  1009   GLUCOSE mg/dL 111*   SODIUM mmol/L 141   POTASSIUM mmol/L 3.9   CHLORIDE mmol/L 104   CO2 mmol/L 28   BUN mg/dL 19   CREATININE mg/dL 1.00   EGFR mL/min/1.73m*2 >90   CALCIUM mg/dL 9.2   ALBUMIN g/dL 4.0   PROTEIN TOTAL g/dL 6.8   BILIRUBIN TOTAL mg/dL 0.3   ALK PHOS U/L 53   ALT U/L 20   AST U/L 15     Results from last 7 days   Lab Units 04/09/24  1009   TSH mIU/L 6.90*   FREE T4 ng/dL 0.88               Image  10/4/2023 CT chest w IV contrast  Impression:   1.  Evolving postsurgical/post radiation changes of the upper lobe, with mild decrease in conspicuity of soft tissue nodularity along the surgical suture margins as described above, when compared to recent prior CT from 04/10/2023. Continued attention on follow-up imaging is recommended.   2. Few small bilateral noncalcified pulmonary nodules, which are stable dating back to 10/01/2021. No new or enlarging pulmonary nodules.  3. Additional stable findings as above.      4/5/2024 CT chest w IV contrast  Impression:   1.  Stable postsurgical changes related to left upper lobe wedge resection with nodular  thickening along the left upper lobe suture line, unchanged.   2. Scattered pulmonary nodules measuring up to 5 mm, stable. No new or enlarging nodule.           Assessment/Plan   ASSESSMENT  Tia Martinez is a 53 y.o. male w/ stage III cT3 cN0 cMx supraglottic larynx cancer, PDL-1 CPS 5 receiving definitive chemoradiation with high dose cisplatin 100mg/m2  started on 12/11/19. Transition to weekly Cisplatin dosing after 1 cycle of HD Cis, completed CRT on 1/30/20. Enrolled on DRINK study, AKA Gatorade study. Post treatment PET/CT showed good response in head & neck with mild metabolic activity in DEE. Later  found to have DEE lung mets confirmed by biopsy and completed SBRT to DEE lung mass on 10/15/20. However continued to have increased metabolic activity on PET/CT and completed DEE wedge resection  on 3/1/21 with path showing only inflammation. Patient has since been followed with surveillance CT chest with contrast.      # Supraglottic larynx cancer with mets to lung  - Reviewed 4/5/24 CT Chest with patient, continue to show stability in lung nodules. He's eating/drinking with no issues, he's in his usual state of health. He has established with a PCP He will have surveillance scan again in 6 months.     # Elevated TSH  - 4/9/24: TSH 6.9. Has had previously mild elevations in TSH that have returned to normal range. He's asymptomatic for hypothyroidism.   - Will recheck in 6 weeks.     # Steatosis  - Notes on 10/10/22 CT Chest. Patient endorse eating greasy meals, drinking 12 pack of beer  - Advised pt to establish PCP to discuss lifestyle modifications decrease high fat food and incorporate vegetables and protein in to diet. Decrease alcohol intake.  - 10/12/23: Patient has loss weight but continue to endorse not eating very health and still has not established with a PCP. Encouraged patient to go on 's website to schedule with a PCP online.      PLAN  -- Recheck TSH/T4 in 6 weeks  -- 7/10/24 JEREMY w/   Sylvie (Q6 mo)  -- CT chest w/ contrast in 6 months on 10/4/24, labs cbc, cmp, tsh/t4  -- RTC with MedOnc on 10/8/24

## 2024-04-09 ENCOUNTER — LAB (OUTPATIENT)
Dept: LAB | Facility: HOSPITAL | Age: 54
End: 2024-04-09
Payer: COMMERCIAL

## 2024-04-09 ENCOUNTER — OFFICE VISIT (OUTPATIENT)
Dept: HEMATOLOGY/ONCOLOGY | Facility: HOSPITAL | Age: 54
End: 2024-04-09
Payer: COMMERCIAL

## 2024-04-09 VITALS
TEMPERATURE: 97 F | WEIGHT: 209 LBS | BODY MASS INDEX: 28.31 KG/M2 | DIASTOLIC BLOOD PRESSURE: 64 MMHG | SYSTOLIC BLOOD PRESSURE: 107 MMHG | OXYGEN SATURATION: 98 % | HEART RATE: 52 BPM | HEIGHT: 72 IN | RESPIRATION RATE: 18 BRPM

## 2024-04-09 DIAGNOSIS — K11.7 XEROSTOMIA DUE TO RADIOTHERAPY: ICD-10-CM

## 2024-04-09 DIAGNOSIS — C32.9 LARYNGEAL CANCER (MULTI): Primary | ICD-10-CM

## 2024-04-09 DIAGNOSIS — R79.89 ELEVATED TSH: ICD-10-CM

## 2024-04-09 DIAGNOSIS — C78.00 SECONDARY SQUAMOUS CELL CARCINOMA OF LUNG, UNSPECIFIED LATERALITY (MULTI): ICD-10-CM

## 2024-04-09 DIAGNOSIS — Z85.89 ENCOUNTER FOR FOLLOW-UP SURVEILLANCE OF HEAD AND NECK CANCER: ICD-10-CM

## 2024-04-09 DIAGNOSIS — R91.8 MULTIPLE LUNG NODULES ON CT: ICD-10-CM
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
## 2024-04-09 DIAGNOSIS — Z08 ENCOUNTER FOR FOLLOW-UP SURVEILLANCE OF HEAD AND NECK CANCER: ICD-10-CM

## 2024-04-09 DIAGNOSIS — C32.9 LARYNGEAL CANCER (MULTI): ICD-10-CM

## 2024-04-09 LAB
ALBUMIN SERPL BCP-MCNC: 4 G/DL (ref 3.4–5)
ALP SERPL-CCNC: 53 U/L (ref 33–120)
ALT SERPL W P-5'-P-CCNC: 20 U/L (ref 10–52)
ANION GAP SERPL CALC-SCNC: 13 MMOL/L (ref 10–20)
AST SERPL W P-5'-P-CCNC: 15 U/L (ref 9–39)
BASOPHILS # BLD AUTO: 0.04 X10*3/UL (ref 0–0.1)
BASOPHILS NFR BLD AUTO: 1 %
BILIRUB SERPL-MCNC: 0.3 MG/DL (ref 0–1.2)
BUN SERPL-MCNC: 19 MG/DL (ref 6–23)
CALCIUM SERPL-MCNC: 9.2 MG/DL (ref 8.6–10.3)
CHLORIDE SERPL-SCNC: 104 MMOL/L (ref 98–107)
CO2 SERPL-SCNC: 28 MMOL/L (ref 21–32)
CREAT SERPL-MCNC: 1 MG/DL (ref 0.5–1.3)
EGFRCR SERPLBLD CKD-EPI 2021: >90 ML/MIN/1.73M*2
EOSINOPHIL # BLD AUTO: 0.15 X10*3/UL (ref 0–0.7)
EOSINOPHIL NFR BLD AUTO: 3.8 %
ERYTHROCYTE [DISTWIDTH] IN BLOOD BY AUTOMATED COUNT: 13.1 % (ref 11.5–14.5)
GLUCOSE SERPL-MCNC: 111 MG/DL (ref 74–99)
HCT VFR BLD AUTO: 42.8 % (ref 41–52)
HGB BLD-MCNC: 14.3 G/DL (ref 13.5–17.5)
IMM GRANULOCYTES # BLD AUTO: 0.01 X10*3/UL (ref 0–0.7)
IMM GRANULOCYTES NFR BLD AUTO: 0.3 % (ref 0–0.9)
LYMPHOCYTES # BLD AUTO: 1.23 X10*3/UL (ref 1.2–4.8)
LYMPHOCYTES NFR BLD AUTO: 31.1 %
MCH RBC QN AUTO: 29.2 PG (ref 26–34)
MCHC RBC AUTO-ENTMCNC: 33.4 G/DL (ref 32–36)
MCV RBC AUTO: 87 FL (ref 80–100)
MONOCYTES # BLD AUTO: 0.41 X10*3/UL (ref 0.1–1)
MONOCYTES NFR BLD AUTO: 10.4 %
NEUTROPHILS # BLD AUTO: 2.12 X10*3/UL (ref 1.2–7.7)
NEUTROPHILS NFR BLD AUTO: 53.4 %
NRBC BLD-RTO: 0 /100 WBCS (ref 0–0)
PLATELET # BLD AUTO: 186 X10*3/UL (ref 150–450)
POTASSIUM SERPL-SCNC: 3.9 MMOL/L (ref 3.5–5.3)
PROT SERPL-MCNC: 6.8 G/DL (ref 6.4–8.2)
RBC # BLD AUTO: 4.9 X10*6/UL (ref 4.5–5.9)
SODIUM SERPL-SCNC: 141 MMOL/L (ref 136–145)
T4 FREE SERPL-MCNC: 0.88 NG/DL (ref 0.78–1.48)
TSH SERPL-ACNC: 6.9 MIU/L (ref 0.44–3.98)
WBC # BLD AUTO: 4 X10*3/UL (ref 4.4–11.3)

## 2024-04-09 PROCEDURE — 36415 COLL VENOUS BLD VENIPUNCTURE: CPT

## 2024-04-09 PROCEDURE — 84443 ASSAY THYROID STIM HORMONE: CPT

## 2024-04-09 PROCEDURE — 80053 COMPREHEN METABOLIC PANEL: CPT

## 2024-04-09 PROCEDURE — 84439 ASSAY OF FREE THYROXINE: CPT

## 2024-04-09 PROCEDURE — 99215 OFFICE O/P EST HI 40 MIN: CPT | Performed by: STUDENT IN AN ORGANIZED HEALTH CARE EDUCATION/TRAINING PROGRAM

## 2024-04-09 PROCEDURE — 1036F TOBACCO NON-USER: CPT | Performed by: STUDENT IN AN ORGANIZED HEALTH CARE EDUCATION/TRAINING PROGRAM

## 2024-04-09 PROCEDURE — 85025 COMPLETE CBC W/AUTO DIFF WBC: CPT

## 2024-04-09 ASSESSMENT — PATIENT HEALTH QUESTIONNAIRE - PHQ9
1. LITTLE INTEREST OR PLEASURE IN DOING THINGS: NOT AT ALL
SUM OF ALL RESPONSES TO PHQ9 QUESTIONS 1 AND 2: 0
2. FEELING DOWN, DEPRESSED OR HOPELESS: NOT AT ALL

## 2024-04-09 ASSESSMENT — PAIN SCALES - GENERAL: PAINLEVEL: 0-NO PAIN

## 2024-04-09 ASSESSMENT — COLUMBIA-SUICIDE SEVERITY RATING SCALE - C-SSRS
1. IN THE PAST MONTH, HAVE YOU WISHED YOU WERE DEAD OR WISHED YOU COULD GO TO SLEEP AND NOT WAKE UP?: NO
2. HAVE YOU ACTUALLY HAD ANY THOUGHTS OF KILLING YOURSELF?: NO
6. HAVE YOU EVER DONE ANYTHING, STARTED TO DO ANYTHING, OR PREPARED TO DO ANYTHING TO END YOUR LIFE?: NO

## 2024-04-09 ASSESSMENT — ENCOUNTER SYMPTOMS: DEPRESSION: 0

## 2024-04-09 NOTE — LETTER
April 9, 2024    Tia Martinez  [address] [place] [address]      To Whom It May Concern:    Tia Martinez was seen by me at [facility] for cancer surveillance visit. Please excuse him from work today.          Sincerely,        Yair Novoa PA-C

## 2024-04-26 ENCOUNTER — SOCIAL WORK (OUTPATIENT)
Dept: CASE MANAGEMENT | Facility: HOSPITAL | Age: 54
End: 2024-04-26
Payer: COMMERCIAL

## 2024-04-26 NOTE — PROGRESS NOTES
This covering  was asked to assist with resubmitting FMLA for pt. Pt asked SW to get more info from his [employer] rep [employer] at . SW called at pt's request and was told that pt's absences are exceeding the estimated time of per onc team. Team responded with a new FMLA form however the estimated time off is almost the same. SW left a mess for pt with this info; attempted to get correct fax number from [employer] rep but did not get return call after several attempts. On this date, MAGDY put in hospital mail to be sent to pt's home for him to get to appropriate person, with this SW's contact info in case it needs to be faxed directly from hospital.  SW to be available as needed.

## 2024-05-08 ENCOUNTER — APPOINTMENT (OUTPATIENT)
Dept: DERMATOLOGY | Facility: CLINIC | Age: 54
End: 2024-05-08
Payer: COMMERCIAL

## 2024-07-05 ASSESSMENT — ENCOUNTER SYMPTOMS
FACIAL SWELLING: 0
COUGH: 0
NAUSEA: 0
UNEXPECTED WEIGHT CHANGE: 0
FATIGUE: 0
SORE THROAT: 0
FEVER: 0
ADENOPATHY: 0
SHORTNESS OF BREATH: 0
APPETITE CHANGE: 0
NECK PAIN: 0
STRIDOR: 0
CHILLS: 0
VOMITING: 0

## 2024-07-05 NOTE — PROGRESS NOTES
Cancer follow up  TSH: Manage by med onc- repeat in October  Chest: Scheduled for 10/2024    Chief Complaint   Patient presents with    Follow-up     HPI:  Tia Martinez is a 53 y.o. 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 He completed chemo/xrt on 1/30/2020.  He completed lung SBRT on 10/15/20 with Dr. Bhandari. He then underwent left VATS which was negative for persistent cancer. Last seen 1/2024. Following with Dr. Burkitt. CT from 4/24 shows stable post surgical changes to DEE and stable scattered nodules in bilateral lungs. Reports his throat is really dry during the day while he is working [employer] during these hot summer days. He is only allowed 2 breaks while he is working.  Otherwise he is doing ok except he feels like his dysphagia is worse recently and he is having more difficulty swallowing.  He is still using lots of liquids to help swallow but has noticed increased dysphagia anyway.  No change in weight.    History:  Dx: T3N0M0 SCCa of the right supraglottis  Dx2: T0N0M1 SCCa of the left lung s/p SBRT 2020  10/19: Hoarseness & dysphagia, scope exam with supraglottic mass  10/30/19: +right supraglottic mass with paraglottic space involvement, no pathologic lymphadenopathy  10/30/19: MBS w/speech +dysphagia with aspiration  11/1/19: S/p triple endoscopy and biopsy +SCCa  11/21/19: CT chest w/o contrast which shows several bilateral pulmonary nodules. A solid 10 mm solid pulmonary  nodule at the lateral aspect of the right upper lobe is concerning for metastatic disease. Further evaluation and attention on short-term follow-up is recommended.   11/27/19: PET/CT which shows thickening of the right vocal cord with significantly increased hypermetabolic activity (max SUV 14.8) extending into the epiglottis with (max SUV 5.3). There is a 0.7 cm right cervical chain zone 2B lymph node with mild hypermetabolic activity (max SUV 3.5). Left upper lobe nodular  density measuring 0.8 cm shows mild  hypermetabolic activity (max SUV 2.2).  12/11/19: Started chemo/xrt with Dr. Saldana and Dr. Bhandari   1/30/20: Completed chemo/xrt   4/30/20: 3 month post treatment PET and repeat CT chest shows excellent response with mild FDG avidity at the larynx specifically the vocal folds, resolution of his cervical lymphadenopathy, stable 7mm left lung nodule.  6/24/20: TSH 2.15  7/17/20: +COVID   8/3/20: Repeat CT chest this shows a left upper lobe nodule which demonstrates interval increase in size and central cavitation. The nodule now measures 1.5 x 1.3 cm, was 0.8 x 0.7 cm. There are multiple additional pulmonary nodules  which are stable in size and appearance compared to the prior exam. For instance, there is a pleural-based nodule in the right lower lobe measuring 5 mm -4 mm nodule in the right lower lobe.   8/25/20: CT guided lung biopsy of the left lung. Path + for SCCa  9/4/20: PET scan +lung nodule   10/15/20: Completed SBRT to L lung with Dr. Bhandari  12/1/20: CT chest which shows interval increase in the size of the left upper lobe lung nodule with now abutment of the adjacent pleura.   1/11/21: PET scan with increase in the DEE lesion, SUV~5, laryngeal SUV5 possibly physiologic  3/1/21: S/p L VATs DEE wedge resection, mediastinal LN dissection, bronch with Dr. Rankin. Path negative for cancer and + reactive changes from prior treatment.   6/10/21: CT chest w/ contrast- stable, no new nodules  1/12/22: CT chest - stable, no new nodules  4/22: CT chest - stable, no new nodules  4/22: TSH- hypothyroid at 5.51.   10/22: TSH 4.8 but T4 normal  10/22: CT chest - stable, no new nodules  1/18: Weight stable  4/11/23: CT chest- stable, no new nodules  10/23: CT chest - stable, no new nodules  4/24: CT chest- stable, no new nodules  7/24: Worsening dysphagia  10/24: CT chest due      SH:  Tob: +current smoker. Quit   ETOH: Moderate/social   Here with wife    ROS:  Review of Systems    Constitutional:  Negative for appetite change, chills, fatigue, fever and unexpected weight change.   HENT:  Positive for trouble swallowing. Negative for dental problem, drooling, ear pain, facial swelling, hearing loss, mouth sores, sore throat, tinnitus and voice change.    Respiratory:  Negative for cough, shortness of breath and stridor.    Gastrointestinal:  Negative for nausea and vomiting.   Musculoskeletal:  Negative for neck pain.   Hematological:  Negative for adenopathy.   All other systems reviewed and are negative.       PE:  ENT Physical Exam  Constitutional  Appearance: patient appears well-developed, patient is cooperative;  Communication/Voice: Communication comments: +hoarseness (stable), +breathy (stable)  Head and Face  Appearance: head appears normal and face appears normal;  Salivary: glands normal;  Ear  Auricles: right auricle normal; left auricle normal;  Ear Canals: right ear canal normal; left ear canal normal;  Tympanic Membranes: right tympanic membrane normal;  Ear comments: Left TM with small area of thinness but intact  Nose  External Nose: nares patent bilaterally; external nose normal;  Internal Nose: nasal mucosa normal; septum normal;  Oral Cavity/Oropharynx  Lips: normal;  Gums: gingiva normal;  Tongue: normal;  Oral mucosa: normal;  OC/OP comments: Uvula deviated to the right s/p tonsillectomy with scars present from tonsillectomy bilaterally  Neck  Neck: radiation changes present;  Thyroid: thyroid normal;  Respiratory  Inspection: breathing unlabored;  Cardiovascular  Inspection: extremities are warm and well perfused;  Neurovestibular  Mental Status: alert and oriented;  Psychiatric: mood normal; affect is appropriate;  Cranial Nerves: cranial nerves intact;       Procedures   PROCEDURE NOTE:  Recommended flexible nasopharyngoscopy.  Risks, benefits, personnel and alternatives were explained.  The patient wished to proceed.  S/he was re-identified.  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  Risks, benefits, personnel and alternatives were explained.  The patient wished to proceed.  S/he was re-identified.  FINDINGS:  The nasopharynx and oropharynx were normal without any lesions or masses visualized.  No masses or lesions were visualized at the base of tongue, vallecula, epiglottis, aryepiglottic folds, pyriform sinuses, and lateral pharyngeal walls.  See findings above.  True vocal fold movement was normal.  The patient's airway was widely patent with no evidence of obstruction.  Patient tolerated the procedure well, and there were no complications.     ASSESSMENT AND PLAN:  Problem List Items Addressed This Visit       Dysphagia    Current Assessment & Plan     Worsening recently  Concern for esophageal stenosis  Recommend DL/esophagoscopy and possible dilation  Risks, benefits and alternatives were discussed at length regarding the above procedure(s).  The patient expressed verbal understanding of these  Follow up in the OR           Hoarseness, persistent    Current Assessment & Plan     Chronic, stable, anterior glottic web  No treatment needed at this time  Not affecting his breathing         Hypothyroidism - Primary    Current Assessment & Plan     Continue daily synthroid  Adverse effect of radiation         Malignant neoplasm of supraglottis (Multi)    Current Assessment & Plan     No evidence of disease on exam or endoscopy  CT chest due 10/24  Follow up in 6 months (but will do Dl/esoph/dilation)         Metastatic squamous cell carcinoma to lung (Multi)    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 uLna Mercer MD.     All medical record entries made by the Scribe were at my direction and personally dictated by me, Dr. Luna Mercer. I have reviewed the chart and agree that the record accurately reflects my personal performance of the history, physical exam, discussion and plan.

## 2024-07-05 NOTE — H&P (VIEW-ONLY)
Cancer follow up  TSH: Manage by med onc- repeat in October  Chest: Scheduled for 10/2024    Chief Complaint   Patient presents with    Follow-up     HPI:  Tia Martinez is a 53 y.o. 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 He completed chemo/xrt on 1/30/2020.  He completed lung SBRT on 10/15/20 with Dr. Bhandari. He then underwent left VATS which was negative for persistent cancer. Last seen 1/2024. Following with Dr. Burkitt. CT from 4/24 shows stable post surgical changes to DEE and stable scattered nodules in bilateral lungs. Reports his throat is really dry during the day while he is working [employer] during these hot summer days. He is only allowed 2 breaks while he is working.  Otherwise he is doing ok except he feels like his dysphagia is worse recently and he is having more difficulty swallowing.  He is still using lots of liquids to help swallow but has noticed increased dysphagia anyway.  No change in weight.    History:  Dx: T3N0M0 SCCa of the right supraglottis  Dx2: T0N0M1 SCCa of the left lung s/p SBRT 2020  10/19: Hoarseness & dysphagia, scope exam with supraglottic mass  10/30/19: +right supraglottic mass with paraglottic space involvement, no pathologic lymphadenopathy  10/30/19: MBS w/speech +dysphagia with aspiration  11/1/19: S/p triple endoscopy and biopsy +SCCa  11/21/19: CT chest w/o contrast which shows several bilateral pulmonary nodules. A solid 10 mm solid pulmonary  nodule at the lateral aspect of the right upper lobe is concerning for metastatic disease. Further evaluation and attention on short-term follow-up is recommended.   11/27/19: PET/CT which shows thickening of the right vocal cord with significantly increased hypermetabolic activity (max SUV 14.8) extending into the epiglottis with (max SUV 5.3). There is a 0.7 cm right cervical chain zone 2B lymph node with mild hypermetabolic activity (max SUV 3.5). Left upper lobe nodular  density measuring 0.8 cm shows mild  hypermetabolic activity (max SUV 2.2).  12/11/19: Started chemo/xrt with Dr. Saldana and Dr. Bhandari   1/30/20: Completed chemo/xrt   4/30/20: 3 month post treatment PET and repeat CT chest shows excellent response with mild FDG avidity at the larynx specifically the vocal folds, resolution of his cervical lymphadenopathy, stable 7mm left lung nodule.  6/24/20: TSH 2.15  7/17/20: +COVID   8/3/20: Repeat CT chest this shows a left upper lobe nodule which demonstrates interval increase in size and central cavitation. The nodule now measures 1.5 x 1.3 cm, was 0.8 x 0.7 cm. There are multiple additional pulmonary nodules  which are stable in size and appearance compared to the prior exam. For instance, there is a pleural-based nodule in the right lower lobe measuring 5 mm -4 mm nodule in the right lower lobe.   8/25/20: CT guided lung biopsy of the left lung. Path + for SCCa  9/4/20: PET scan +lung nodule   10/15/20: Completed SBRT to L lung with Dr. Bhandari  12/1/20: CT chest which shows interval increase in the size of the left upper lobe lung nodule with now abutment of the adjacent pleura.   1/11/21: PET scan with increase in the DEE lesion, SUV~5, laryngeal SUV5 possibly physiologic  3/1/21: S/p L VATs DEE wedge resection, mediastinal LN dissection, bronch with Dr. Rankin. Path negative for cancer and + reactive changes from prior treatment.   6/10/21: CT chest w/ contrast- stable, no new nodules  1/12/22: CT chest - stable, no new nodules  4/22: CT chest - stable, no new nodules  4/22: TSH- hypothyroid at 5.51.   10/22: TSH 4.8 but T4 normal  10/22: CT chest - stable, no new nodules  1/18: Weight stable  4/11/23: CT chest- stable, no new nodules  10/23: CT chest - stable, no new nodules  4/24: CT chest- stable, no new nodules  7/24: Worsening dysphagia  10/24: CT chest due      SH:  Tob: +current smoker. Quit   ETOH: Moderate/social   Here with wife    ROS:  Review of Systems    Constitutional:  Negative for appetite change, chills, fatigue, fever and unexpected weight change.   HENT:  Positive for trouble swallowing. Negative for dental problem, drooling, ear pain, facial swelling, hearing loss, mouth sores, sore throat, tinnitus and voice change.    Respiratory:  Negative for cough, shortness of breath and stridor.    Gastrointestinal:  Negative for nausea and vomiting.   Musculoskeletal:  Negative for neck pain.   Hematological:  Negative for adenopathy.   All other systems reviewed and are negative.       PE:  ENT Physical Exam  Constitutional  Appearance: patient appears well-developed, patient is cooperative;  Communication/Voice: Communication comments: +hoarseness (stable), +breathy (stable)  Head and Face  Appearance: head appears normal and face appears normal;  Salivary: glands normal;  Ear  Auricles: right auricle normal; left auricle normal;  Ear Canals: right ear canal normal; left ear canal normal;  Tympanic Membranes: right tympanic membrane normal;  Ear comments: Left TM with small area of thinness but intact  Nose  External Nose: nares patent bilaterally; external nose normal;  Internal Nose: nasal mucosa normal; septum normal;  Oral Cavity/Oropharynx  Lips: normal;  Gums: gingiva normal;  Tongue: normal;  Oral mucosa: normal;  OC/OP comments: Uvula deviated to the right s/p tonsillectomy with scars present from tonsillectomy bilaterally  Neck  Neck: radiation changes present;  Thyroid: thyroid normal;  Respiratory  Inspection: breathing unlabored;  Cardiovascular  Inspection: extremities are warm and well perfused;  Neurovestibular  Mental Status: alert and oriented;  Psychiatric: mood normal; affect is appropriate;  Cranial Nerves: cranial nerves intact;       Procedures   PROCEDURE NOTE:  Recommended flexible nasopharyngoscopy.  Risks, benefits, personnel and alternatives were explained.  The patient wished to proceed.  S/he was re-identified.  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  Risks, benefits, personnel and alternatives were explained.  The patient wished to proceed.  S/he was re-identified.  FINDINGS:  The nasopharynx and oropharynx were normal without any lesions or masses visualized.  No masses or lesions were visualized at the base of tongue, vallecula, epiglottis, aryepiglottic folds, pyriform sinuses, and lateral pharyngeal walls.  See findings above.  True vocal fold movement was normal.  The patient's airway was widely patent with no evidence of obstruction.  Patient tolerated the procedure well, and there were no complications.     ASSESSMENT AND PLAN:  Problem List Items Addressed This Visit       Dysphagia    Current Assessment & Plan     Worsening recently  Concern for esophageal stenosis  Recommend DL/esophagoscopy and possible dilation  Risks, benefits and alternatives were discussed at length regarding the above procedure(s).  The patient expressed verbal understanding of these  Follow up in the OR           Hoarseness, persistent    Current Assessment & Plan     Chronic, stable, anterior glottic web  No treatment needed at this time  Not affecting his breathing         Hypothyroidism - Primary    Current Assessment & Plan     Continue daily synthroid  Adverse effect of radiation         Malignant neoplasm of supraglottis (Multi)    Current Assessment & Plan     No evidence of disease on exam or endoscopy  CT chest due 10/24  Follow up in 6 months (but will do Dl/esoph/dilation)         Metastatic squamous cell carcinoma to lung (Multi)    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 Luna Mercer MD.     All medical record entries made by the Scribe were at my direction and personally dictated by me, Dr. Luna Mercer. I have reviewed the chart and agree that the record accurately reflects my personal performance of the history, physical exam, discussion and plan.

## 2024-07-10 ENCOUNTER — OFFICE VISIT (OUTPATIENT)
Dept: OTOLARYNGOLOGY | Facility: HOSPITAL | Age: 54
End: 2024-07-10
Payer: COMMERCIAL

## 2024-07-10 VITALS — BODY MASS INDEX: 28.06 KG/M2 | WEIGHT: 207.2 LBS | HEIGHT: 72 IN

## 2024-07-10 DIAGNOSIS — E03.9 ACQUIRED HYPOTHYROIDISM: Primary | ICD-10-CM

## 2024-07-10 DIAGNOSIS — C32.1 MALIGNANT NEOPLASM OF SUPRAGLOTTIS (MULTI): ICD-10-CM

## 2024-07-10 DIAGNOSIS — R49.0 HOARSENESS, PERSISTENT: ICD-10-CM

## 2024-07-10 DIAGNOSIS — R13.12 OROPHARYNGEAL DYSPHAGIA: ICD-10-CM

## 2024-07-10 DIAGNOSIS — K11.7 XEROSTOMIA DUE TO RADIOTHERAPY: ICD-10-CM
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
## 2024-07-10 DIAGNOSIS — C78.00 SECONDARY SQUAMOUS CELL CARCINOMA OF LUNG, UNSPECIFIED LATERALITY (MULTI): ICD-10-CM

## 2024-07-10 PROCEDURE — 99214 OFFICE O/P EST MOD 30 MIN: CPT | Performed by: OTOLARYNGOLOGY

## 2024-07-10 PROCEDURE — 1036F TOBACCO NON-USER: CPT | Performed by: OTOLARYNGOLOGY

## 2024-07-10 PROCEDURE — 92511 NASOPHARYNGOSCOPY: CPT | Performed by: OTOLARYNGOLOGY

## 2024-07-10 ASSESSMENT — ENCOUNTER SYMPTOMS
TROUBLE SWALLOWING: 1
VOICE CHANGE: 0

## 2024-07-10 ASSESSMENT — PATIENT HEALTH QUESTIONNAIRE - PHQ9
10. IF YOU CHECKED OFF ANY PROBLEMS, HOW DIFFICULT HAVE THESE PROBLEMS MADE IT FOR YOU TO DO YOUR WORK, TAKE CARE OF THINGS AT HOME, OR GET ALONG WITH OTHER PEOPLE: SOMEWHAT DIFFICULT
2. FEELING DOWN, DEPRESSED OR HOPELESS: SEVERAL DAYS
SUM OF ALL RESPONSES TO PHQ9 QUESTIONS 1 AND 2: 2
1. LITTLE INTEREST OR PLEASURE IN DOING THINGS: SEVERAL DAYS

## 2024-07-10 ASSESSMENT — PAIN SCALES - GENERAL: PAINLEVEL: 0-NO PAIN

## 2024-07-10 NOTE — ASSESSMENT & PLAN NOTE
Worsening recently  Concern for esophageal stenosis  Recommend DL/esophagoscopy and possible dilation  Risks, benefits and alternatives were discussed at length regarding the above procedure(s).  The patient expressed verbal understanding of these  Follow up in the OR

## 2024-07-10 NOTE — ASSESSMENT & PLAN NOTE
No evidence of disease on exam or endoscopy  CT chest due 10/24  Follow up in 6 months (but will do Dl/esoph/dilation)

## 2024-07-10 NOTE — ASSESSMENT & PLAN NOTE
Chronic, mild to moderate roger when at work during hot days  Increase water intake  Continue conservative management

## 2024-07-10 NOTE — ASSESSMENT & PLAN NOTE
Chronic, stable, anterior glottic web  No treatment needed at this time  Not affecting his breathing

## 2024-07-10 NOTE — PATIENT INSTRUCTIONS
Dr. Mercer evaluated you today.    Your care plan is outlined below:  -- Follow up with Dr. Mercer in 6 mo.  This appointment was scheduled at the end of your visit today.  If you need to reschedule, please call the office at [phone].  Please keep in mind that last minute cancellations often result in delayed follow-up appointments.     General appointment line please call [phone]  For general questions or scheduling issues please call [phone] option #2   For medical questions or surgery scheduling please call [phone] on Mondays, Wednesdays and Thursdays or [phone] on Tuesdays and Fridays. Please be sure to leave a voice mail or your call will not be able to be returned.     Dr. Mercer makes every effort to run on time for your appointments.  Therefore, if you are more than 30 minutes late for your appointment, unrelated to a scan or another appointment such as chemotherapy or radiation, your appointment will need to be rescheduled to another day.  We appreciate your understanding.

## 2024-07-29 ENCOUNTER — ANESTHESIA EVENT (OUTPATIENT)
Dept: OPERATING ROOM | Facility: HOSPITAL | Age: 54
End: 2024-07-29
Payer: COMMERCIAL

## 2024-07-30 ENCOUNTER — ANESTHESIA (OUTPATIENT)
Dept: OPERATING ROOM | Facility: HOSPITAL | Age: 54
End: 2024-07-30
Payer: COMMERCIAL

## 2024-07-30 ENCOUNTER — HOSPITAL ENCOUNTER (OUTPATIENT)
Facility: HOSPITAL | Age: 54
Setting detail: OUTPATIENT SURGERY
Discharge: HOME | End: 2024-07-30
Attending: OTOLARYNGOLOGY | Admitting: OTOLARYNGOLOGY
Payer: COMMERCIAL

## 2024-07-30 VITALS
TEMPERATURE: 96.8 F | HEIGHT: 72 IN | BODY MASS INDEX: 28.7 KG/M2 | WEIGHT: 211.86 LBS | RESPIRATION RATE: 18 BRPM | HEART RATE: 60 BPM | DIASTOLIC BLOOD PRESSURE: 80 MMHG | SYSTOLIC BLOOD PRESSURE: 135 MMHG | OXYGEN SATURATION: 95 %

## 2024-07-30 PROCEDURE — 31525 DX LARYNGOSCOPY EXCL NB: CPT | Performed by: OTOLARYNGOLOGY

## 2024-07-30 PROCEDURE — 3600000008 HC OR TIME - EACH INCREMENTAL 1 MINUTE - PROCEDURE LEVEL THREE: Performed by: OTOLARYNGOLOGY

## 2024-07-30 PROCEDURE — 7100000009 HC PHASE TWO TIME - INITIAL BASE CHARGE: Performed by: OTOLARYNGOLOGY

## 2024-07-30 PROCEDURE — 2500000005 HC RX 250 GENERAL PHARMACY W/O HCPCS: Performed by: ANESTHESIOLOGIST ASSISTANT

## 2024-07-30 PROCEDURE — 3700000001 HC GENERAL ANESTHESIA TIME - INITIAL BASE CHARGE: Performed by: OTOLARYNGOLOGY

## 2024-07-30 PROCEDURE — 2500000001 HC RX 250 WO HCPCS SELF ADMINISTERED DRUGS (ALT 637 FOR MEDICARE OP): Performed by: OTOLARYNGOLOGY

## 2024-07-30 PROCEDURE — 2500000004 HC RX 250 GENERAL PHARMACY W/ HCPCS (ALT 636 FOR OP/ED): Performed by: ANESTHESIOLOGIST ASSISTANT

## 2024-07-30 PROCEDURE — 3700000002 HC GENERAL ANESTHESIA TIME - EACH INCREMENTAL 1 MINUTE: Performed by: OTOLARYNGOLOGY

## 2024-07-30 PROCEDURE — 7100000001 HC RECOVERY ROOM TIME - INITIAL BASE CHARGE: Performed by: OTOLARYNGOLOGY

## 2024-07-30 PROCEDURE — 43226 ESOPH ENDOSCOPY DILATION: CPT

## 2024-07-30 PROCEDURE — 2500000005 HC RX 250 GENERAL PHARMACY W/O HCPCS: Performed by: OTOLARYNGOLOGY

## 2024-07-30 PROCEDURE — 3600000003 HC OR TIME - INITIAL BASE CHARGE - PROCEDURE LEVEL THREE: Performed by: OTOLARYNGOLOGY

## 2024-07-30 PROCEDURE — 7100000010 HC PHASE TWO TIME - EACH INCREMENTAL 1 MINUTE: Performed by: OTOLARYNGOLOGY

## 2024-07-30 PROCEDURE — 7100000002 HC RECOVERY ROOM TIME - EACH INCREMENTAL 1 MINUTE: Performed by: OTOLARYNGOLOGY

## 2024-07-30 PROCEDURE — 43453 DILATE ESOPHAGUS: CPT | Performed by: OTOLARYNGOLOGY

## 2024-07-30 RX ORDER — ESMOLOL HYDROCHLORIDE 10 MG/ML
INJECTION INTRAVENOUS AS NEEDED
Status: DISCONTINUED | OUTPATIENT
Start: 2024-07-30 | End: 2024-07-30

## 2024-07-30 RX ORDER — SODIUM CHLORIDE, SODIUM LACTATE, POTASSIUM CHLORIDE, CALCIUM CHLORIDE 600; 310; 30; 20 MG/100ML; MG/100ML; MG/100ML; MG/100ML
50 INJECTION, SOLUTION INTRAVENOUS CONTINUOUS
Status: DISCONTINUED | OUTPATIENT
Start: 2024-07-30 | End: 2024-07-30 | Stop reason: HOSPADM

## 2024-07-30 RX ORDER — OXYCODONE HYDROCHLORIDE 5 MG/1
5 TABLET ORAL EVERY 4 HOURS PRN
Status: DISCONTINUED | OUTPATIENT
Start: 2024-07-30 | End: 2024-07-30 | Stop reason: HOSPADM

## 2024-07-30 RX ORDER — SODIUM CHLORIDE 0.9 G/100ML
IRRIGANT IRRIGATION AS NEEDED
Status: DISCONTINUED | OUTPATIENT
Start: 2024-07-30 | End: 2024-07-30 | Stop reason: HOSPADM

## 2024-07-30 RX ORDER — ONDANSETRON HYDROCHLORIDE 2 MG/ML
INJECTION, SOLUTION INTRAVENOUS AS NEEDED
Status: DISCONTINUED | OUTPATIENT
Start: 2024-07-30 | End: 2024-07-30

## 2024-07-30 RX ORDER — MIDAZOLAM HYDROCHLORIDE 1 MG/ML
INJECTION INTRAMUSCULAR; INTRAVENOUS AS NEEDED
Status: DISCONTINUED | OUTPATIENT
Start: 2024-07-30 | End: 2024-07-30

## 2024-07-30 RX ORDER — WATER 1 ML/ML
IRRIGANT IRRIGATION AS NEEDED
Status: DISCONTINUED | OUTPATIENT
Start: 2024-07-30 | End: 2024-07-30 | Stop reason: HOSPADM

## 2024-07-30 RX ORDER — ONDANSETRON HYDROCHLORIDE 2 MG/ML
4 INJECTION, SOLUTION INTRAVENOUS ONCE AS NEEDED
Status: DISCONTINUED | OUTPATIENT
Start: 2024-07-30 | End: 2024-07-30 | Stop reason: HOSPADM

## 2024-07-30 RX ORDER — HYDROMORPHONE HYDROCHLORIDE 1 MG/ML
0.2 INJECTION, SOLUTION INTRAMUSCULAR; INTRAVENOUS; SUBCUTANEOUS EVERY 5 MIN PRN
Status: DISCONTINUED | OUTPATIENT
Start: 2024-07-30 | End: 2024-07-30 | Stop reason: HOSPADM

## 2024-07-30 RX ORDER — ACETAMINOPHEN 325 MG/1
650 TABLET ORAL EVERY 4 HOURS PRN
Status: DISCONTINUED | OUTPATIENT
Start: 2024-07-30 | End: 2024-07-30 | Stop reason: HOSPADM

## 2024-07-30 RX ORDER — HYDROMORPHONE HYDROCHLORIDE 1 MG/ML
0.5 INJECTION, SOLUTION INTRAMUSCULAR; INTRAVENOUS; SUBCUTANEOUS EVERY 5 MIN PRN
Status: DISCONTINUED | OUTPATIENT
Start: 2024-07-30 | End: 2024-07-30 | Stop reason: HOSPADM

## 2024-07-30 RX ORDER — REMIFENTANIL HYDROCHLORIDE 1 MG/ML
INJECTION, POWDER, LYOPHILIZED, FOR SOLUTION INTRAVENOUS AS NEEDED
Status: DISCONTINUED | OUTPATIENT
Start: 2024-07-30 | End: 2024-07-30

## 2024-07-30 RX ORDER — SODIUM CHLORIDE, SODIUM LACTATE, POTASSIUM CHLORIDE, CALCIUM CHLORIDE 600; 310; 30; 20 MG/100ML; MG/100ML; MG/100ML; MG/100ML
INJECTION, SOLUTION INTRAVENOUS CONTINUOUS PRN
Status: DISCONTINUED | OUTPATIENT
Start: 2024-07-30 | End: 2024-07-30

## 2024-07-30 RX ORDER — LIDOCAINE HYDROCHLORIDE 10 MG/ML
0.1 INJECTION INFILTRATION; PERINEURAL ONCE
Status: DISCONTINUED | OUTPATIENT
Start: 2024-07-30 | End: 2024-07-30 | Stop reason: HOSPADM

## 2024-07-30 RX ORDER — PROPOFOL 10 MG/ML
INJECTION, EMULSION INTRAVENOUS AS NEEDED
Status: DISCONTINUED | OUTPATIENT
Start: 2024-07-30 | End: 2024-07-30

## 2024-07-30 RX ORDER — LIDOCAINE HCL/PF 100 MG/5ML
SYRINGE (ML) INTRAVENOUS AS NEEDED
Status: DISCONTINUED | OUTPATIENT
Start: 2024-07-30 | End: 2024-07-30

## 2024-07-30 ASSESSMENT — PAIN - FUNCTIONAL ASSESSMENT
PAIN_FUNCTIONAL_ASSESSMENT: UNABLE TO SELF-REPORT
PAIN_FUNCTIONAL_ASSESSMENT: 0-10
PAIN_FUNCTIONAL_ASSESSMENT: UNABLE TO SELF-REPORT

## 2024-07-30 ASSESSMENT — PAIN SCALES - GENERAL
PAINLEVEL_OUTOF10: 0 - NO PAIN
PAIN_LEVEL: 0
PAINLEVEL_OUTOF10: 0 - NO PAIN

## 2024-07-30 NOTE — ANESTHESIA PROCEDURE NOTES
Airway  Date/Time: 7/30/2024 11:55 AM  Urgency: elective    Airway not difficult    Staffing  Performed: attending   Authorized by: Corin Echevarria MD    Performed by: RASHEL Watts  Patient location during procedure: OR    Indications and Patient Condition  Indications for airway management: anesthesia  Spontaneous Ventilation: absent  Sedation level: deep  Preoxygenated: yes  Patient position: sniffing  MILS maintained throughout  Mask difficulty assessment: 0 - not attempted  Planned trial extubation    Final Airway Details  Final airway type: endotracheal airway      Successful airway: ETT  Cuffed: yes   Successful intubation technique: video laryngoscopy  Facilitating devices/methods: intubating stylet  Endotracheal tube insertion site: oral  Blade: Bel  Blade size: #4  ETT size (mm): 6.0  Cormack-Lehane Classification: grade I - full view of glottis  Placement verified by: chest auscultation and capnometry   Cuff volume (mL): 10  Measured from: teeth  ETT to teeth (cm): 21  Number of attempts at approach: 1  Ventilation between attempts: BVM  Number of other approaches attempted: 0

## 2024-07-30 NOTE — ANESTHESIA POSTPROCEDURE EVALUATION
Patient: Tia Martinez    Procedure Summary       Date: 07/30/24 Room / Location: [facility] OR 04 / [facility] OR    Anesthesia Start: 1146 Anesthesia Stop: 1248    Procedures:       Dilatation Esophagus      Direct Laryngoscopy (Mouth) Diagnosis:       Oropharyngeal dysphagia      (Oropharyngeal dysphagia [R13.12])    Surgeons: Luna Mercer MD Responsible Provider: Corin Echevarria MD    Anesthesia Type: general ASA Status: 3            Anesthesia Type: general    Vitals Value Taken Time   /87 07/30/24 1245   Temp 36.1 °C (97 °F) 07/30/24 1245   Pulse 55 07/30/24 1254   Resp 16 07/30/24 1245   SpO2 100 % 07/30/24 1254   Vitals shown include unfiled device data.    Anesthesia Post Evaluation    Patient location during evaluation: PACU  Patient participation: complete - patient participated  Level of consciousness: sleepy but conscious  Pain score: 0  Pain management: adequate  Airway patency: patent  Cardiovascular status: acceptable  Respiratory status: acceptable  Hydration status: acceptable  Postoperative Nausea and Vomiting: none        No notable events documented.

## 2024-07-30 NOTE — OP NOTE
Dilatation Esophagus, Direct Laryngoscopy Operative Note     Date: 2024  OR Location: [facility] OR    Name: Tia Martinez, : 1970, Age: 53 y.o., MRN: [id number], Sex: male    Diagnosis  Pre-op Diagnosis      * Oropharyngeal dysphagia [R13.12] Post-op Diagnosis     * Oropharyngeal dysphagia [R13.12]     Procedures  Esophagoscopy & esophageal dilation over a guidewire  Dilatation Esophagus  78746 - [place] DILATION ESOPHAGUS GUIDE WIRE    Direct Laryngoscopy  82375 - [place] LARYNGOSCOPY W/WO TRACHEOSCOPY DX EXCEPT     Surgeons      * Luna Mercer - Primary    Resident/Fellow/Other Assistant:  Surgeons and Role:  Forest Kaur,     Procedure Summary  Anesthesia: General  ASA: III  Anesthesia Staff: Anesthesiologist: Corin Echevarria MD  C-AA: RASHEL Watts  Estimated Blood Loss: 0mL  Intra-op Medications: Administrations occurring from 1200 to 1305 on 24:  * No intraprocedure medications in log *           Anesthesia Record               Intraprocedure I/O Totals       None           Specimen: No specimens collected     Staff:   Circulator: Rusty Forte Person: Jackie  Circulator: Aviva     Drains and/or Catheters: * None in log *    Tourniquet Times: N/A      Implants: N/A    Findings: Evidence of Barretts esophagus, +esophageal stricture, dilated 15-20Fr with good resultant widening of lumen    Indications: Tia Martinez is an 53 y.o. male who is having surgery for Oropharyngeal dysphagia [R13.12].     The patient was seen in the preoperative area. The risks, benefits, complications, treatment options, non-operative alternatives, expected recovery and outcomes were discussed with the patient. The possibilities of reaction to medication, pulmonary aspiration, injury to surrounding structures, bleeding, recurrent infection, the need for additional procedures, failure to diagnose a condition, and creating a complication requiring transfusion or operation were discussed with the patient. The patient  concurred with the proposed plan, giving informed consent.  The site of surgery was properly noted/marked if necessary per policy. The patient has been actively warmed in preoperative area. Preoperative antibiotics are not indicated. Venous thrombosis prophylaxis have been ordered including bilateral sequential compression devices    Procedure Details:  53 year old male who is having surgery for Oropharyngeal dysphagia [R13.12]. He has a h/o laryngeal cancer s/p chemoXRT, now with worsening dysphagia.  He has previously undergone esophageal dilation with good result but recent worsening and was interested in another dilation.     Patient was seen and evaluated in the pre-operative area. Informed consent was obtained as described above. Patient was then taken to the operative room by the anesthesia team. General anesthesia was induced and intubated. Patient was then turned 90 degrees towards the ENT team. Time out was performed by Dr. Mercer     First, direct laryngoscopy was performed using the Dedo laryngoscope. Evidence of radiation changes without any evidence of recurrence or new masses or lesions.       Next, flexible esophagoscopy was performed with the scope inserted to the level of the stenosis.  It was tight but the scope was able to be passed to the stomach.  The guidewire was placed and the esophagoscope was removed. Next using the guidewire, the neopharynx was serially dilated from 15 to 20mm using the Savary dilators. The esophagoscope was then again placed and advanced easily past the stenosis and into the stomach. On careful removal of the scope all sites of the gastric and esophageal mucosa were evaluated and there were not any concerning lesions noted. At this point the procedure was terminated. Patient was turned back over to the anesthesia team and was awakened, extubated and transported to the PACU in stable condition.    Complications:  None; patient tolerated the procedure well.    Disposition:  PACU - hemodynamically stable.  Condition: stable       Attending Attestation: I was present for the entirety of the procedure(s).     Luna Mercer  Phone Number: [phone]

## 2024-07-30 NOTE — ANESTHESIA PREPROCEDURE EVALUATION
Patient: Tia Martinez    Procedure Information       Date/Time: 07/30/24 1200    Procedures:       Dilatation Esophagus      Direct Laryngoscopy (Mouth)    Location: [facility] OR 04 / [facility]    Surgeons: Luna Mercer MD            Relevant Problems   Anesthesia (within normal limits)      Pulmonary   (+) Metastatic squamous cell carcinoma to lung (Multi)      GI   (+) Dysphagia      Endocrine   (+) Hypothyroidism      HEENT  History of throat cancer.       Clinical information reviewed:    Allergies  Meds               NPO Detail:  No data recorded     Physical Exam    Airway  Mallampati: III     Cardiovascular    Dental    Pulmonary    Abdominal            Anesthesia Plan    History of general anesthesia?: yes  History of complications of general anesthesia?: no    ASA 3     general     Anesthetic plan and risks discussed with patient.

## 2024-07-30 NOTE — DISCHARGE INSTRUCTIONS
Follow up with Dr. Mercer as scheduled. If you do not have a post op appointment, call her office at [phone].    Start with soft diet and slowly advanced as tolerated.    Take tylenol and/or ibuprofen for pain.

## 2024-08-27 ENCOUNTER — APPOINTMENT (OUTPATIENT)
Dept: RADIOLOGY | Facility: HOSPITAL | Age: 54
End: 2024-08-27
Payer: COMMERCIAL

## 2024-08-27 PROCEDURE — 70450 CT HEAD/BRAIN W/O DYE: CPT

## 2024-08-27 PROCEDURE — 84484 ASSAY OF TROPONIN QUANT: CPT | Performed by: NURSE PRACTITIONER

## 2024-08-27 PROCEDURE — 85025 COMPLETE CBC W/AUTO DIFF WBC: CPT | Performed by: NURSE PRACTITIONER

## 2024-08-27 PROCEDURE — 99284 EMERGENCY DEPT VISIT MOD MDM: CPT

## 2024-08-27 PROCEDURE — 36415 COLL VENOUS BLD VENIPUNCTURE: CPT | Performed by: NURSE PRACTITIONER

## 2024-08-27 PROCEDURE — 70450 CT HEAD/BRAIN W/O DYE: CPT | Performed by: STUDENT IN AN ORGANIZED HEALTH CARE EDUCATION/TRAINING PROGRAM

## 2024-08-27 ASSESSMENT — PAIN - FUNCTIONAL ASSESSMENT: PAIN_FUNCTIONAL_ASSESSMENT: 0-10

## 2024-08-27 ASSESSMENT — PAIN SCALES - GENERAL: PAINLEVEL_OUTOF10: 7

## 2024-08-27 ASSESSMENT — PAIN DESCRIPTION - LOCATION: LOCATION: HEAD

## 2024-08-27 ASSESSMENT — PAIN DESCRIPTION - ONSET: ONSET: PROGRESSIVE

## 2024-08-27 ASSESSMENT — PAIN DESCRIPTION - DESCRIPTORS: DESCRIPTORS: ACHING

## 2024-08-27 ASSESSMENT — COLUMBIA-SUICIDE SEVERITY RATING SCALE - C-SSRS
2. HAVE YOU ACTUALLY HAD ANY THOUGHTS OF KILLING YOURSELF?: NO
1. IN THE PAST MONTH, HAVE YOU WISHED YOU WERE DEAD OR WISHED YOU COULD GO TO SLEEP AND NOT WAKE UP?: NO
6. HAVE YOU EVER DONE ANYTHING, STARTED TO DO ANYTHING, OR PREPARED TO DO ANYTHING TO END YOUR LIFE?: NO

## 2024-08-27 ASSESSMENT — PAIN DESCRIPTION - FREQUENCY: FREQUENCY: INTERMITTENT

## 2024-08-27 ASSESSMENT — PAIN DESCRIPTION - PROGRESSION: CLINICAL_PROGRESSION: GRADUALLY WORSENING

## 2024-08-27 ASSESSMENT — PAIN DESCRIPTION - PAIN TYPE: TYPE: ACUTE PAIN

## 2024-08-28 ENCOUNTER — HOSPITAL ENCOUNTER (EMERGENCY)
Facility: HOSPITAL | Age: 54
Discharge: HOME | End: 2024-08-28
Attending: EMERGENCY MEDICINE
Payer: COMMERCIAL

## 2024-08-28 VITALS
HEART RATE: 64 BPM | SYSTOLIC BLOOD PRESSURE: 122 MMHG | BODY MASS INDEX: 24.92 KG/M2 | HEIGHT: 72 IN | OXYGEN SATURATION: 96 % | TEMPERATURE: 98.1 F | RESPIRATION RATE: 18 BRPM | DIASTOLIC BLOOD PRESSURE: 74 MMHG | WEIGHT: 184 LBS

## 2024-08-28 DIAGNOSIS — R51.9 NONINTRACTABLE HEADACHE, UNSPECIFIED CHRONICITY PATTERN, UNSPECIFIED HEADACHE TYPE: Primary | ICD-10-CM

## 2024-08-28 DIAGNOSIS — H53.2 DIPLOPIA: ICD-10-CM

## 2024-08-28 LAB
ALBUMIN SERPL BCP-MCNC: 3.2 G/DL (ref 3.4–5)
ALP SERPL-CCNC: 49 U/L (ref 33–120)
ALT SERPL W P-5'-P-CCNC: 15 U/L (ref 10–52)
ANION GAP SERPL CALC-SCNC: 10 MMOL/L (ref 10–20)
AST SERPL W P-5'-P-CCNC: 17 U/L (ref 9–39)
BASOPHILS # BLD AUTO: 0.07 X10*3/UL (ref 0–0.1)
BASOPHILS NFR BLD AUTO: 1.4 %
BILIRUB SERPL-MCNC: 0.3 MG/DL (ref 0–1.2)
BUN SERPL-MCNC: 20 MG/DL (ref 6–23)
CALCIUM SERPL-MCNC: 7.2 MG/DL (ref 8.6–10.3)
CARDIAC TROPONIN I PNL SERPL HS: 3 NG/L (ref 0–20)
CARDIAC TROPONIN I PNL SERPL HS: <3 NG/L (ref 0–20)
CHLORIDE SERPL-SCNC: 107 MMOL/L (ref 98–107)
CO2 SERPL-SCNC: 23 MMOL/L (ref 21–32)
CREAT SERPL-MCNC: 0.86 MG/DL (ref 0.5–1.3)
EGFRCR SERPLBLD CKD-EPI 2021: >90 ML/MIN/1.73M*2
EOSINOPHIL # BLD AUTO: 0.17 X10*3/UL (ref 0–0.7)
EOSINOPHIL NFR BLD AUTO: 3.4 %
ERYTHROCYTE [DISTWIDTH] IN BLOOD BY AUTOMATED COUNT: 12.7 % (ref 11.5–14.5)
GLUCOSE SERPL-MCNC: 74 MG/DL (ref 74–99)
HCT VFR BLD AUTO: 44.3 % (ref 41–52)
HGB BLD-MCNC: 14.9 G/DL (ref 13.5–17.5)
IMM GRANULOCYTES # BLD AUTO: 0.03 X10*3/UL (ref 0–0.7)
IMM GRANULOCYTES NFR BLD AUTO: 0.6 % (ref 0–0.9)
LYMPHOCYTES # BLD AUTO: 1.52 X10*3/UL (ref 1.2–4.8)
LYMPHOCYTES NFR BLD AUTO: 30.5 %
MCH RBC QN AUTO: 29.7 PG (ref 26–34)
MCHC RBC AUTO-ENTMCNC: 33.6 G/DL (ref 32–36)
MCV RBC AUTO: 88 FL (ref 80–100)
MONOCYTES # BLD AUTO: 0.41 X10*3/UL (ref 0.1–1)
MONOCYTES NFR BLD AUTO: 8.2 %
NEUTROPHILS # BLD AUTO: 2.78 X10*3/UL (ref 1.2–7.7)
NEUTROPHILS NFR BLD AUTO: 55.9 %
NRBC BLD-RTO: 0 /100 WBCS (ref 0–0)
PLATELET # BLD AUTO: 133 X10*3/UL (ref 150–450)
POTASSIUM SERPL-SCNC: 3.4 MMOL/L (ref 3.5–5.3)
PROT SERPL-MCNC: 5.4 G/DL (ref 6.4–8.2)
RBC # BLD AUTO: 5.02 X10*6/UL (ref 4.5–5.9)
SODIUM SERPL-SCNC: 137 MMOL/L (ref 136–145)
WBC # BLD AUTO: 5 X10*3/UL (ref 4.4–11.3)

## 2024-08-28 PROCEDURE — 2500000004 HC RX 250 GENERAL PHARMACY W/ HCPCS (ALT 636 FOR OP/ED): Performed by: EMERGENCY MEDICINE

## 2024-08-28 PROCEDURE — 96361 HYDRATE IV INFUSION ADD-ON: CPT

## 2024-08-28 PROCEDURE — 96374 THER/PROPH/DIAG INJ IV PUSH: CPT

## 2024-08-28 PROCEDURE — 96375 TX/PRO/DX INJ NEW DRUG ADDON: CPT

## 2024-08-28 PROCEDURE — 80053 COMPREHEN METABOLIC PANEL: CPT | Performed by: NURSE PRACTITIONER

## 2024-08-28 PROCEDURE — 84484 ASSAY OF TROPONIN QUANT: CPT | Performed by: NURSE PRACTITIONER

## 2024-08-28 RX ORDER — DIPHENHYDRAMINE HYDROCHLORIDE 50 MG/ML
25 INJECTION INTRAMUSCULAR; INTRAVENOUS ONCE
Status: COMPLETED | OUTPATIENT
Start: 2024-08-28 | End: 2024-08-28

## 2024-08-28 RX ORDER — PROCHLORPERAZINE EDISYLATE 5 MG/ML
10 INJECTION INTRAMUSCULAR; INTRAVENOUS ONCE
Status: COMPLETED | OUTPATIENT
Start: 2024-08-28 | End: 2024-08-28

## 2024-08-28 RX ORDER — KETOROLAC TROMETHAMINE 30 MG/ML
30 INJECTION, SOLUTION INTRAMUSCULAR; INTRAVENOUS ONCE
Status: COMPLETED | OUTPATIENT
Start: 2024-08-28 | End: 2024-08-28

## 2024-08-28 ASSESSMENT — PAIN SCALES - GENERAL: PAINLEVEL_OUTOF10: 5 - MODERATE PAIN

## 2024-08-28 NOTE — ED NOTES
Pt is here for headache and blurry vision that started last week. The pt states that his eyes feel like trigeminal freeze and his headache  is behind his eyes. He states that his vision goes in opposite directions.       Yan Cárdenas RN  08/28/24 0021

## 2024-08-28 NOTE — ED TRIAGE NOTES
"As provider-in-triage, I performed a medical screening history and physical exam on this patient.    HISTORY OF PRESENT ILLNESS: 54 yo AA male with pmhx of Esophageal Cancer with recent surgery 7/28/24 presents with complaints of intermittent headache that has gotten progressively worse. He endorses associated double vision, eye pain, and inability to ambulate (unsteady). He reports symptoms have been intermittent since his surgery in July but got progressively worse today. He rates HA 8/110 not the worse headache of his life. Pain is constant today, described as throbbing and feeling \"brain freeze when you eat cold ice cream and it goes to your head.\" There are no relieving symptoms. Symptoms are exacerbated with activity. He denies weakness, slurred speech, confusion or facial droop. Denies light headed or dizziness.     PHYSICAL EXAM  Vital Signs reviewed. A&OX3  NIH 0  Well-appearing, sitting in wheelchair, no acute distress, heart is regular rate and rhythm, lungs clear to auscultation     MDM  Plan: Workup initiated. Patient to be moved to the main ER or FT, pending bed availability, for further evaluation, diagnosis, treatment, and disposition.   For the remainder of the patient's workup and ED course, please see the main ED provider note.    CT Head concerns for mass with hx of esophageal cancer  Basic labs to evaluate electrolyte imbalance or anemia  Troponin and EKG to evaluate for arrhythmia     I evaluated this patient in triage with the RN. Due to the patient's complaint labs and or imaging were ordered by myself in an attempt to expedite patient care however I am not participating in care after evaluation. This is a preliminary assessment. Pt does not appear in acute distress at this time. They are stable and will have a full evaluation as soon as possible. They will be cared for by another provider who will possibly order more labs, imaging or interventions. Pt did not have a full ROS or PE completed " by myself however below is a summary with reasons for orders.

## 2024-08-28 NOTE — ED TRIAGE NOTES
"Pt present to ED from home for headache and \"eyes going all different ways\" for 2 weeks with worsening today. Patient states that his vision does get blurry intermittently. Patient is being treated for lung and esophageal cancer and had surgery on n7/27/2024 and has been having problems since.  "

## 2024-08-28 NOTE — ED PROVIDER NOTES
HPI   Chief Complaint   Patient presents with    Headache    Eye Problem       The patient has a history of lung cancer and some sort of throat cancer in remission.  He states he had an esophageal surgery a month ago and then had a headache 1 week later.  Kimmy has had headache for the last 2 3 weeks.  He notes that he has intermittent diplopia that last for a minute or 2 and then goes away.  He does not see an eye doctor.  Denies any fever, cough, congestion, vomiting, diarrhea.  He has no numbness or weakness.  Denies any fall or trauma or injury.              Patient History   Past Medical History:   Diagnosis Date    Fracture of unspecified part of scapula, unspecified shoulder, initial encounter for closed fracture     Scapula fracture    Personal history of malignant neoplasm of larynx 10/19/2022    History of malignant neoplasm of larynx     Past Surgical History:   Procedure Laterality Date    CT GUIDED PERCUTANEOUS BIOPSY LUNG  8/25/2020    CT GUIDED PERCUTANEOUS BIOPSY LUNG 8/25/2020 [facility]    OTHER SURGICAL HISTORY  03/08/2021    Lung wedge resection    TONSILLECTOMY  11/15/2016    Tonsillectomy With Adenoidectomy     Family History   Problem Relation Name Age of Onset    Heart attack Mother      Thyroid disease Mother      Diabetes Father      Lung cancer Father      Heart failure Brother      Diabetes Mother's Sister       Social History     Tobacco Use    Smoking status: Former     Types: Cigarettes    Smokeless tobacco: Never   Substance Use Topics    Alcohol use: Yes     Comment: socially    Drug use: Never       Physical Exam   ED Triage Vitals [08/27/24 2026]   Temperature Heart Rate Respirations BP   36.7 °C (98.1 °F) 64 18 124/86      Pulse Ox Temp Source Heart Rate Source Patient Position   100 % Temporal Monitor Sitting      BP Location FiO2 (%)     Right arm --       Physical Exam  Vitals and nursing note reviewed.   Constitutional:       General: He is not in acute distress.      Appearance: He is well-developed.   HENT:      Head: Normocephalic and atraumatic.   Eyes:      General: No visual field deficit.     Extraocular Movements:      Right eye: Normal extraocular motion and no nystagmus.      Left eye: Normal extraocular motion and no nystagmus.      Conjunctiva/sclera: Conjunctivae normal.   Cardiovascular:      Rate and Rhythm: Normal rate and regular rhythm.      Heart sounds: No murmur heard.  Pulmonary:      Effort: Pulmonary effort is normal. No respiratory distress.      Breath sounds: Normal breath sounds.   Abdominal:      Palpations: Abdomen is soft.      Tenderness: There is no abdominal tenderness.   Musculoskeletal:         General: No swelling.      Cervical back: Neck supple.   Skin:     General: Skin is warm and dry.      Capillary Refill: Capillary refill takes less than 2 seconds.   Neurological:      Mental Status: He is alert and oriented to person, place, and time.      GCS: GCS eye subscore is 4. GCS verbal subscore is 5. GCS motor subscore is 6.      Cranial Nerves: No cranial nerve deficit, dysarthria or facial asymmetry.   Psychiatric:         Mood and Affect: Mood normal.           ED Course & MDM   Diagnoses as of 08/28/24 0226   Nonintractable headache, unspecified chronicity pattern, unspecified headache type   Diplopia                 No data recorded     [place] Coma Scale Score: 15 (08/27/24 2029 : Agnes Guerrero RN)                           Medical Decision Making  Patient is an appropriate neurologic exam here.  His NIH is 0.  The patient CT is just reveals no obvious acute intracranial process.  No reproducible diplopia on my exam.    Procedure  Procedures     Mark Tariq MD  08/28/24 0226

## 2024-10-01 ASSESSMENT — ENCOUNTER SYMPTOMS
NAUSEA: 0
COUGH: 0
HEADACHES: 0
TROUBLE SWALLOWING: 0
SHORTNESS OF BREATH: 0
FEVER: 0
ARTHRALGIAS: 0
CHILLS: 0
VOMITING: 0
LEG SWELLING: 0
SORE THROAT: 0
CONSTIPATION: 0
DIARRHEA: 0
LIGHT-HEADEDNESS: 0
ABDOMINAL PAIN: 0
NUMBNESS: 0

## 2024-10-01 NOTE — PROGRESS NOTES
Patient ID: Tia Martinez is a 53 y.o. male.  Diagnosis:  Squamous Cell Carcinoma of supraglottic larynx, P16 Negative  Stagin/2019: cT3 cN0 cMx, Stage III   2020: T0 N0 M1 with mets to lung  Date of Diagnosis: 2019    Providers:  ENT Surgeon: Dr. Luna Mercer  MedOn: Dr. Rachelle Saldana --> Dr. Kyunghee Burkitt, X. Katherine Feng ([place])  RadOnc: Dr. Luigi Bhandari    Prior Therapy  2019 - 2020: Concurrent chemoradiation HD Cisplatin (100mg/m2) x1 and changed to weekly dosing with Cisplatin (40mg/m2) and 70gy RT in 35fx  10/5 - 10/15/2020: 50gy SBRT to left upper lobe lung nodule metastatic from Squamous Cell Carcinoma of Larynx.     Sites of Disease  DEE      ONCOLOGIC HISTORY  - 2019 Presented to Dr. Mercer  - Enrolled on the DRINK study. Cycle 1 of HD cisplatin was complicated by MIRTHA with Creat 2.3 due to dehydration. He received IVF 3x a week in [facility] the week after chemo with good improvement. When he presented for 2nd dose HD Cisplatin his ANC was below  parameters for treatment. Given low ANC and elevated Creatinine after HD Cis, he was changed to weekly dosing which he received on 19 when his counts recovered.   - 20 post treatment PET/CT showed Marked interval decrease in metabolic activity within the larynx consistent with response of patient's known squamous cell carcinoma to treatment. Mild increased  activity is seen in the area of the vocal cords which could be related to phonation versus residual tumor. Resolution of previously seen hypermetabolic cervical lymphadenopathy. Stable pulmonary nodule within the left upper lobe with mild metabolic activity,  nonspecific.      Lung nodule:   - 8/3/20 CT chest w/ contrast: showed interval increase in size of the pulmonary nodule in the left upper lobe, which now demonstrates central cavitation. Findings are concerning for potential metastatic disease.  - 20: CT guided lung biopsy that confirmed mets.   - 10/15/20: Completed  50 gy SBRT to nodules.    - 12/1/20 CT chest w/ con reviewed with Dr. Bhandari. There was  Interval increase in size of a left upper lobe cavitating pulmonary nodule with now abutment of the adjacent pleura. After discussion with Rad onc, it maybe radiation related fibrosis and it  was recommended for PET   - 1/11/21 PET/CT showed continued increase activity in left upper lobe mass w/ significant interval increase in size and hypermetabolic activity  - 3/1/21: Left upper lobe wedge resection. Pathology did not show evidence of cancer--only inflammation. Discussed the results with the  patient and plan for surveillance  - Surveillance CT Chest has been stable since     Past Medical History:   Past Medical History:  No date: Fracture of unspecified part of scapula, unspecified   shoulder, initial encounter for closed fracture      Comment:  Scapula fracture  10/19/2022: Personal history of malignant neoplasm of larynx      Comment:  History of malignant neoplasm of larynx   Surgical History:    Past Surgical History:   Procedure Laterality Date    CT GUIDED PERCUTANEOUS BIOPSY LUNG  8/25/2020    CT GUIDED PERCUTANEOUS BIOPSY LUNG 8/25/2020 [facility]    OTHER SURGICAL HISTORY  03/08/2021    Lung wedge resection    TONSILLECTOMY  11/15/2016    Tonsillectomy With Adenoidectomy      Family History:    Family History   Problem Relation Name Age of Onset    Heart attack Mother      Thyroid disease Mother      Diabetes Father      Lung cancer Father      Heart failure Brother      Diabetes Mother's Sister       Family Oncology History:    Cancer-related family history includes Lung cancer in his father.  Social History:    Social History     Tobacco Use    Smoking status: Former     Types: Cigarettes    Smokeless tobacco: Never   Substance Use Topics    Alcohol use: Yes     Comment: socially    Drug use: Never          Subjective   Chief Complaint: Squamous Cell Carcinoma of supraglottic larynx with mets to  lung    HPI  Tia Martinez is a 53 y.o. male w/ hisotory of supraglottic larynx cancer, s/p CRT with HD Cisplatin then transition to weekly Cisplatin dosing after 1 cycle of HD Cis, completed  CRT on 1/30/20. Post treatment PET/CT showed good response in head & neck with mild metabolic activity in DEE. Later found to have lung mets confirmed by biopsy and completed SBRT to DEE lung mass in Oct 2020. However continued to have increased metabolic activity on PET/CT and completed DEE wedge resection on 3/1/21 with path showing only inflammation. Patient has since been followed with surveillance CT chest with contrast.    Interval History  Patient is 3 years 6 months from last treatment, he presents today for follow up and review of surveillance CT Chest.    10/4/24 CT Chest ***    He's feeling well overall.   Has stable muscle tightness in the neck when he eats/chews or yawn. This is chronic but occurs infrequently.   He continues to have dry mouth that is well managed with water, taste is back to normal. He has lost weight since last visit.      Reviewed  4/8/24 CT Chest with patient and his wife in clinic today. Scans shows stable post surgical changes to DEE and stable scattered nodules in bilateral lungs. No new nodules or enlarge LN in chest.      ROS  Review of Systems   Constitutional:  Negative for chills and fever.   HENT:   Negative for hearing loss, lump/mass, mouth sores, nosebleeds, sore throat, tinnitus and trouble swallowing.    Respiratory:  Negative for cough and shortness of breath.    Cardiovascular:  Negative for chest pain and leg swelling.   Gastrointestinal:  Negative for abdominal pain, constipation, diarrhea, nausea and vomiting.   Musculoskeletal:  Negative for arthralgias.   Skin:  Negative for rash.   Neurological:  Negative for headaches, light-headedness and numbness.       Allergies  No Known Allergies     Medications  Current Outpatient Medications   Medication Instructions     acetaminophen (TYLENOL) 1,000 mg, oral, Every 6 hours PRN    levothyroxine (SYNTHROID, LEVOXYL) 75 mcg, oral, Daily, Take on empty stomach      Objective   VS: There were no vitals taken for this visit.  Weight:   Wt Readings from Last 5 Encounters:   08/27/24 83.5 kg (184 lb)   07/30/24 96.1 kg (211 lb 13.8 oz)   07/10/24 94 kg (207 lb 3.2 oz)   04/09/24 94.8 kg (209 lb)   01/25/24 96.1 kg (211 lb 14.4 oz)     Physical Exam  Constitutional:       Appearance: Normal appearance. He is well-developed.   HENT:      Head: Normocephalic and atraumatic.      Right Ear: External ear normal. No tenderness.      Left Ear: External ear normal. No tenderness.      Nose: Nose normal.      Mouth/Throat:      Mouth: Mucous membranes are moist. No injury or oral lesions.      Tongue: No lesions.      Pharynx: Oropharynx is clear.   Eyes:      Extraocular Movements: Extraocular movements intact.      Conjunctiva/sclera: Conjunctivae normal.      Pupils: Pupils are equal, round, and reactive to light.   Neck:      Thyroid: No thyroid mass.   Cardiovascular:      Rate and Rhythm: Normal rate and regular rhythm.   Pulmonary:      Effort: Pulmonary effort is normal. No respiratory distress.      Breath sounds: Normal breath sounds.   Abdominal:      General: Bowel sounds are normal. There is no distension or abdominal bruit.      Palpations: Abdomen is soft. There is no mass.      Tenderness: There is no abdominal tenderness.   Musculoskeletal:         General: Normal range of motion.      Cervical back: Normal range of motion and neck supple. No signs of trauma. Normal range of motion.      Right lower leg: No edema.      Left lower leg: No edema.   Lymphadenopathy:      Cervical: No cervical adenopathy.      Upper Body:      Right upper body: No axillary adenopathy.      Left upper body: No axillary adenopathy.   Skin:     General: Skin is warm and dry.      Findings: No lesion or rash.   Neurological:      General: No focal deficit  present.      Mental Status: He is alert and oriented to person, place, and time.      Gait: Gait is intact.   Psychiatric:         Mood and Affect: Mood and affect normal.         Behavior: Behavior is cooperative.     Diagnostic Results     Labs  Labs from 9/5/23 reviewed and are at baseline.                                Image  10/4/2023 CT chest w IV contrast  Impression:   1.  Evolving postsurgical/post radiation changes of the upper lobe, with mild decrease in conspicuity of soft tissue nodularity along the surgical suture margins as described above, when compared to recent prior CT from 04/10/2023. Continued attention on follow-up imaging is recommended.   2. Few small bilateral noncalcified pulmonary nodules, which are stable dating back to 10/01/2021. No new or enlarging pulmonary nodules.  3. Additional stable findings as above.      4/5/2024 CT chest w IV contrast  Impression:   1.  Stable postsurgical changes related to left upper lobe wedge resection with nodular thickening along the left upper lobe suture line, unchanged.   2. Scattered pulmonary nodules measuring up to 5 mm, stable. No new or enlarging nodule.           Assessment/Plan   ASSESSMENT  Tia Martinez is a 53 y.o. male w/ stage III cT3 cN0 cMx supraglottic larynx cancer, PDL-1 CPS 5 receiving definitive chemoradiation with high dose cisplatin 100mg/m2  started on 12/11/19. Transition to weekly Cisplatin dosing after 1 cycle of HD Cis, completed CRT on 1/30/20. Enrolled on DRINK study, AKA Gatorade study. Post treatment PET/CT showed good response in head & neck with mild metabolic activity in DEE. Later  found to have DEE lung mets confirmed by biopsy and completed SBRT to DEE lung mass on 10/15/20. However continued to have increased metabolic activity on PET/CT and completed DEE wedge resection  on 3/1/21 with path showing only inflammation. Patient has since been followed with surveillance CT chest with contrast.      # Supraglottic  larynx cancer with mets to lung  - Reviewed 4/5/24 CT Chest with patient, continue to show stability in lung nodules. He's eating/drinking with no issues, he's in his usual state of health. He has established with a PCP He will have surveillance scan again in 6 months.     # Elevated TSH  - 4/9/24: TSH 6.9. Has had previously mild elevations in TSH that have returned to normal range. He's asymptomatic for hypothyroidism.   - Will recheck in 6 weeks.     # Steatosis  - Notes on 10/10/22 CT Chest. Patient endorse eating greasy meals, drinking 12 pack of beer  - Advised pt to establish PCP to discuss lifestyle modifications decrease high fat food and incorporate vegetables and protein in to diet. Decrease alcohol intake.  - 10/12/23: Patient has loss weight but continue to endorse not eating very health and still has not established with a PCP. Encouraged patient to go on 's website to schedule with a PCP online.      PLAN  -- Recheck TSH/T4 in 6 weeks  -- 7/10/24 FUV w/ Dr. Mercer (Q6 mo)  -- CT chest w/ contrast in 6 months on 10/4/24, labs cbc, cmp, tsh/t4  -- [facility]

## 2024-10-04 ENCOUNTER — LAB (OUTPATIENT)
Dept: LAB | Facility: HOSPITAL | Age: 54
End: 2024-10-04
Payer: COMMERCIAL

## 2024-10-04 ENCOUNTER — HOSPITAL ENCOUNTER (OUTPATIENT)
Dept: RADIOLOGY | Facility: HOSPITAL | Age: 54
Discharge: HOME | End: 2024-10-04
Payer: COMMERCIAL

## 2024-10-04 DIAGNOSIS — C32.9 LARYNGEAL CANCER (MULTI): ICD-10-CM

## 2024-10-04 DIAGNOSIS — R91.8 MULTIPLE LUNG NODULES ON CT: ICD-10-CM

## 2024-10-04 LAB
ALBUMIN SERPL BCP-MCNC: 4.1 G/DL (ref 3.4–5)
ALP SERPL-CCNC: 63 U/L (ref 33–120)
ALT SERPL W P-5'-P-CCNC: 29 U/L (ref 10–52)
ANION GAP SERPL CALC-SCNC: 11 MMOL/L (ref 10–20)
AST SERPL W P-5'-P-CCNC: 28 U/L (ref 9–39)
BASOPHILS # BLD AUTO: 0.05 X10*3/UL (ref 0–0.1)
BASOPHILS NFR BLD AUTO: 1 %
BILIRUB SERPL-MCNC: 0.3 MG/DL (ref 0–1.2)
BUN SERPL-MCNC: 18 MG/DL (ref 6–23)
CALCIUM SERPL-MCNC: 9.5 MG/DL (ref 8.6–10.3)
CHLORIDE SERPL-SCNC: 106 MMOL/L (ref 98–107)
CO2 SERPL-SCNC: 29 MMOL/L (ref 21–32)
CREAT SERPL-MCNC: 1.03 MG/DL (ref 0.5–1.3)
EGFRCR SERPLBLD CKD-EPI 2021: 87 ML/MIN/1.73M*2
EOSINOPHIL # BLD AUTO: 0.15 X10*3/UL (ref 0–0.7)
EOSINOPHIL NFR BLD AUTO: 3.1 %
ERYTHROCYTE [DISTWIDTH] IN BLOOD BY AUTOMATED COUNT: 12.6 % (ref 11.5–14.5)
GLUCOSE SERPL-MCNC: 105 MG/DL (ref 74–99)
HCT VFR BLD AUTO: 44.2 % (ref 41–52)
HGB BLD-MCNC: 14.8 G/DL (ref 13.5–17.5)
IMM GRANULOCYTES # BLD AUTO: 0.02 X10*3/UL (ref 0–0.7)
IMM GRANULOCYTES NFR BLD AUTO: 0.4 % (ref 0–0.9)
LYMPHOCYTES # BLD AUTO: 1.59 X10*3/UL (ref 1.2–4.8)
LYMPHOCYTES NFR BLD AUTO: 33.2 %
MCH RBC QN AUTO: 29.9 PG (ref 26–34)
MCHC RBC AUTO-ENTMCNC: 33.5 G/DL (ref 32–36)
MCV RBC AUTO: 89 FL (ref 80–100)
MONOCYTES # BLD AUTO: 0.48 X10*3/UL (ref 0.1–1)
MONOCYTES NFR BLD AUTO: 10 %
NEUTROPHILS # BLD AUTO: 2.5 X10*3/UL (ref 1.2–7.7)
NEUTROPHILS NFR BLD AUTO: 52.3 %
NRBC BLD-RTO: 0 /100 WBCS (ref 0–0)
PLATELET # BLD AUTO: 179 X10*3/UL (ref 150–450)
POTASSIUM SERPL-SCNC: 4.3 MMOL/L (ref 3.5–5.3)
PROT SERPL-MCNC: 7.1 G/DL (ref 6.4–8.2)
RBC # BLD AUTO: 4.95 X10*6/UL (ref 4.5–5.9)
SODIUM SERPL-SCNC: 142 MMOL/L (ref 136–145)
TSH SERPL-ACNC: 3.63 MIU/L (ref 0.44–3.98)
WBC # BLD AUTO: 4.8 X10*3/UL (ref 4.4–11.3)

## 2024-10-04 PROCEDURE — 80053 COMPREHEN METABOLIC PANEL: CPT

## 2024-10-04 PROCEDURE — 2550000001 HC RX 255 CONTRASTS: Performed by: STUDENT IN AN ORGANIZED HEALTH CARE EDUCATION/TRAINING PROGRAM

## 2024-10-04 PROCEDURE — 36415 COLL VENOUS BLD VENIPUNCTURE: CPT

## 2024-10-04 PROCEDURE — 84443 ASSAY THYROID STIM HORMONE: CPT

## 2024-10-04 PROCEDURE — 85025 COMPLETE CBC W/AUTO DIFF WBC: CPT

## 2024-10-04 PROCEDURE — 71260 CT THORAX DX C+: CPT

## 2024-10-08 ENCOUNTER — APPOINTMENT (OUTPATIENT)
Dept: HEMATOLOGY/ONCOLOGY | Facility: HOSPITAL | Age: 54
End: 2024-10-08
Payer: COMMERCIAL

## 2024-10-08 DIAGNOSIS — C78.00 SECONDARY SQUAMOUS CELL CARCINOMA OF LUNG, UNSPECIFIED LATERALITY (MULTI): Primary | ICD-10-CM

## 2024-10-08 DIAGNOSIS — R79.89 ELEVATED TSH: ICD-10-CM

## 2024-10-08 NOTE — LETTER
October 8, 2024     Patient: Tia Martinez   YOB: 1970   Date of Visit: 10/8/2024       To Whom It May Concern:    Tia Martinez was seen in my clinic on 10/8/2024 at 10:30 am. Please excuse Tia for his absence from work on this day to make the appointment.    If you have any questions or concerns, please don't hesitate to call.         Sincerely,         Yair Novoa PA-C

## 2024-10-14 ASSESSMENT — ENCOUNTER SYMPTOMS
DIARRHEA: 0
ABDOMINAL PAIN: 0
LIGHT-HEADEDNESS: 0
ARTHRALGIAS: 0
CHILLS: 0
NUMBNESS: 0
HEADACHES: 0
TROUBLE SWALLOWING: 0
SHORTNESS OF BREATH: 0
FEVER: 0
LEG SWELLING: 0
SORE THROAT: 0
CONSTIPATION: 0
VOMITING: 0
NAUSEA: 0
COUGH: 0

## 2024-10-14 NOTE — PROGRESS NOTES
Patient ID: Tia Martinez is a 53 y.o. male.  Diagnosis:  Squamous Cell Carcinoma of supraglottic larynx, P16 Negative  Stagin/2019: cT3 cN0 cMx, Stage III   2020: T0 N0 M1 with mets to lung  Date of Diagnosis: 2019    Providers:  ENT Surgeon: Dr. Luna Mercer  MedOn: Dr. Rachelle Saldana --> Dr. Kyunghee Burkitt, X. Katherine Feng ([place])  RadOnc: Dr. Luigi Bhandari    Prior Therapy  2019 - 2020: Concurrent chemoradiation HD Cisplatin (100mg/m2) x1 and changed to weekly dosing with Cisplatin (40mg/m2) and 70gy RT in 35fx  10/5 - 10/15/2020: 50gy SBRT to left upper lobe lung nodule metastatic from Squamous Cell Carcinoma of Larynx.     Sites of Disease  DEE      ONCOLOGIC HISTORY  - 2019 Presented to Dr. Mercer  - Enrolled on the DRINK study. Cycle 1 of HD cisplatin was complicated by MIRTHA with Creat 2.3 due to dehydration. He received IVF 3x a week in [facility] the week after chemo with good improvement. When he presented for 2nd dose HD Cisplatin his ANC was below  parameters for treatment. Given low ANC and elevated Creatinine after HD Cis, he was changed to weekly dosing which he received on 19 when his counts recovered.   - 20 post treatment PET/CT showed Marked interval decrease in metabolic activity within the larynx consistent with response of patient's known squamous cell carcinoma to treatment. Mild increased  activity is seen in the area of the vocal cords which could be related to phonation versus residual tumor. Resolution of previously seen hypermetabolic cervical lymphadenopathy. Stable pulmonary nodule within the left upper lobe with mild metabolic activity,  nonspecific.      Lung nodule:   - 8/3/20 CT chest w/ contrast: showed interval increase in size of the pulmonary nodule in the left upper lobe, which now demonstrates central cavitation. Findings are concerning for potential metastatic disease.  - 20: CT guided lung biopsy that confirmed mets.   - 10/15/20:  Completed 50 gy SBRT to nodules.    - 12/1/20 CT chest w/ con reviewed with Dr. Bhandari. There was  Interval increase in size of a left upper lobe cavitating pulmonary nodule with now abutment of the adjacent pleura. After discussion with Rad onc, it maybe radiation related fibrosis and it  was recommended for PET   - 1/11/21 PET/CT showed continued increase activity in left upper lobe mass w/ significant interval increase in size and hypermetabolic activity  - 3/1/21: Left upper lobe wedge resection. Pathology did not show evidence of cancer--only inflammation. Discussed the results with the  patient and plan for surveillance  - Surveillance CT Chest has been stable since     Past Medical History:   Past Medical History:  No date: Fracture of unspecified part of scapula, unspecified   shoulder, initial encounter for closed fracture      Comment:  Scapula fracture  10/19/2022: Personal history of malignant neoplasm of larynx      Comment:  History of malignant neoplasm of larynx   Surgical History:    Past Surgical History:   Procedure Laterality Date    CT GUIDED PERCUTANEOUS BIOPSY LUNG  8/25/2020    CT GUIDED PERCUTANEOUS BIOPSY LUNG 8/25/2020 [facility]    OTHER SURGICAL HISTORY  03/08/2021    Lung wedge resection    TONSILLECTOMY  11/15/2016    Tonsillectomy With Adenoidectomy      Family History:    Family History   Problem Relation Name Age of Onset    Heart attack Mother      Thyroid disease Mother      Diabetes Father      Lung cancer Father      Heart failure Brother      Diabetes Mother's Sister       Family Oncology History:    Cancer-related family history includes Lung cancer in his father.  Social History:    Social History     Tobacco Use    Smoking status: Former     Types: Cigarettes    Smokeless tobacco: Never   Substance Use Topics    Alcohol use: Yes     Comment: socially    Drug use: Never          Subjective   Chief Complaint: Squamous Cell Carcinoma of supraglottic larynx with mets to  lung    HPI  Tia Martinez is a 53 y.o. male w/ hisotory of supraglottic larynx cancer, s/p CRT with HD Cisplatin then transition to weekly Cisplatin dosing after 1 cycle of HD Cis, completed  CRT on 1/30/20. Post treatment PET/CT showed good response in head & neck with mild metabolic activity in DEE. Later found to have lung mets confirmed by biopsy and completed SBRT to DEE lung mass in Oct 2020. However continued to have increased metabolic activity on PET/CT and completed DEE wedge resection on 3/1/21 with path showing only inflammation. Patient has since been followed with surveillance CT chest with contrast.    Interval History  Patient is 3 years 6 months from last treatment, he presents today for follow up and review of surveillance CT Chest.    10/4/24 CT chest w IV contrast showed stable post treatment/surgical changes from prior left upper lobe wedge resection with nodular thickening/consolidation along the suture line. Has stable scattered pulmonary nodules as described. No new or enlarging nodules.    He's feeling well overall.   Has stable muscle tightness in the neck when he eats/chews or yawn. This is chronic but occurs infrequently.   He continues to have dry mouth that is well managed with water, taste is back to normal. He has lost weight since last visit.   Denies tinnitus and peripheral neuropathy.     ROS  Review of Systems   Constitutional:  Negative for chills and fever.   HENT:   Negative for hearing loss, lump/mass, mouth sores, nosebleeds, sore throat, tinnitus and trouble swallowing.    Respiratory:  Negative for cough and shortness of breath.    Cardiovascular:  Negative for chest pain and leg swelling.   Gastrointestinal:  Negative for abdominal pain, constipation, diarrhea, nausea and vomiting.   Musculoskeletal:  Negative for arthralgias.   Skin:  Negative for rash.   Neurological:  Negative for headaches, light-headedness and numbness.       Allergies  No Known Allergies      Medications  Current Outpatient Medications   Medication Instructions    acetaminophen (TYLENOL) 1,000 mg, Every 6 hours PRN    levothyroxine (SYNTHROID, LEVOXYL) 75 mcg, Daily      Objective   VS: /67   Pulse 66   Temp 36.2 °C (97.2 °F) (Core)   Resp 20   Wt 96.1 kg (211 lb 13.8 oz)   SpO2 98%   BMI 28.73 kg/m²   Weight:   Wt Readings from Last 5 Encounters:   10/17/24 96.1 kg (211 lb 13.8 oz)   08/27/24 83.5 kg (184 lb)   07/30/24 96.1 kg (211 lb 13.8 oz)   07/10/24 94 kg (207 lb 3.2 oz)   04/09/24 94.8 kg (209 lb)     Physical Exam  Constitutional:       Appearance: Normal appearance. He is well-developed.   HENT:      Head: Normocephalic and atraumatic.      Right Ear: External ear normal. No tenderness.      Left Ear: External ear normal. No tenderness.      Nose: Nose normal.      Mouth/Throat:      Mouth: Mucous membranes are moist. No injury or oral lesions.      Tongue: No lesions.      Pharynx: Oropharynx is clear.   Eyes:      Extraocular Movements: Extraocular movements intact.      Conjunctiva/sclera: Conjunctivae normal.      Pupils: Pupils are equal, round, and reactive to light.   Neck:      Thyroid: No thyroid mass.   Cardiovascular:      Rate and Rhythm: Normal rate and regular rhythm.   Pulmonary:      Effort: Pulmonary effort is normal. No respiratory distress.      Breath sounds: Normal breath sounds.   Abdominal:      General: Bowel sounds are normal. There is no distension or abdominal bruit.      Palpations: Abdomen is soft. There is no mass.      Tenderness: There is no abdominal tenderness.   Musculoskeletal:         General: Normal range of motion.      Cervical back: Normal range of motion and neck supple. No signs of trauma. Normal range of motion.      Right lower leg: No edema.      Left lower leg: No edema.   Lymphadenopathy:      Cervical: No cervical adenopathy.      Upper Body:      Right upper body: No axillary adenopathy.      Left upper body: No axillary  adenopathy.   Skin:     General: Skin is warm and dry.      Findings: No lesion or rash.   Neurological:      General: No focal deficit present.      Mental Status: He is alert and oriented to person, place, and time.      Gait: Gait is intact.   Psychiatric:         Mood and Affect: Mood and affect normal.         Behavior: Behavior is cooperative.       Diagnostic Results     Labs  Labs from 10/7/24 reviewed and are at baseline.                                Image  10/4/2023 CT chest w IV contrast  Impression:   1.  Evolving postsurgical/post radiation changes of the upper lobe, with mild decrease in conspicuity of soft tissue nodularity along the surgical suture margins as described above, when compared to recent prior CT from 04/10/2023. Continued attention on follow-up imaging is recommended.   2. Few small bilateral noncalcified pulmonary nodules, which are stable dating back to 10/01/2021. No new or enlarging pulmonary nodules.  3. Additional stable findings as above.      4/5/2024 CT chest w IV contrast  Impression:   1.  Stable postsurgical changes related to left upper lobe wedge resection with nodular thickening along the left upper lobe suture line, unchanged.   2. Scattered pulmonary nodules measuring up to 5 mm, stable. No new or enlarging nodule.           Assessment/Plan   ASSESSMENT  Tia Martinez is a 53 y.o. male w/ stage III cT3 cN0 cMx supraglottic larynx cancer, PDL-1 CPS 5 receiving definitive chemoradiation with high dose cisplatin 100mg/m2  started on 12/11/19. Transition to weekly Cisplatin dosing after 1 cycle of HD Cis, completed CRT on 1/30/20. Enrolled on DRINK study, AKA [employer] study. Post treatment PET/CT showed good response in head & neck with mild metabolic activity in DEE. Later  found to have DEE lung mets confirmed by biopsy and completed SBRT to DEE lung mass on 10/15/20. However continued to have increased metabolic activity on PET/CT and completed DEE wedge resection  on  3/1/21 with path showing only inflammation. Patient has since been followed with surveillance CT chest with contrast.      # Supraglottic larynx cancer with mets to lung  - Reviewed 10/7/24 CT Chest with patient, continue to show stability in lung nodules. He's eating/drinking with no issues, he's in his usual state of health. He has established with a PCP. Disucss that he should brush teeth BID and establish with a dentist for at least twice a year dental exams and cleanings. He will have surveillance scan again in 6 months.     # Elevated TSH  - 4/9/24: TSH 6.9. Has had previously mild elevations in TSH that have returned to normal range. He's asymptomatic for hypothyroidism.   - 10/4/24: TSH 3.63. Will continue to monitor    # Steatosis  - Notes on 10/10/22 CT Chest. Patient endorse eating greasy meals, drinking 12 pack of beer  - Advised pt to establish PCP to discuss lifestyle modifications decrease high fat food and incorporate vegetables and protein in to diet. Decrease alcohol intake.  - 10/12/23: Patient has loss weight but continue to endorse not eating very health and still has not established with a PCP. Encouraged patient to go on 's website to schedule with a PCP online.      PLAN  -- RTC 6 months w/ CT Chest w/ contast on 4/17/25 and FUV on 4/23/24  -- FUV w/ ENT as scheduled  -- Patient to establish w/ dentist

## 2024-10-17 ENCOUNTER — OFFICE VISIT (OUTPATIENT)
Dept: HEMATOLOGY/ONCOLOGY | Facility: HOSPITAL | Age: 54
End: 2024-10-17
Payer: COMMERCIAL

## 2024-10-17 VITALS
TEMPERATURE: 97.2 F | HEART RATE: 66 BPM | OXYGEN SATURATION: 98 % | SYSTOLIC BLOOD PRESSURE: 118 MMHG | RESPIRATION RATE: 20 BRPM | DIASTOLIC BLOOD PRESSURE: 67 MMHG | BODY MASS INDEX: 28.73 KG/M2 | WEIGHT: 211.86 LBS

## 2024-10-17 DIAGNOSIS — C78.00 SECONDARY SQUAMOUS CELL CARCINOMA OF LUNG, UNSPECIFIED LATERALITY (MULTI): ICD-10-CM

## 2024-10-17 DIAGNOSIS — C15.9: ICD-10-CM

## 2024-10-17 DIAGNOSIS — Z85.89 ENCOUNTER FOR FOLLOW-UP SURVEILLANCE OF HEAD AND NECK CANCER: ICD-10-CM
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
## 2024-10-17 DIAGNOSIS — R79.89 ELEVATED TSH: ICD-10-CM

## 2024-10-17 DIAGNOSIS — C78.00: ICD-10-CM

## 2024-10-17 DIAGNOSIS — Z08 ENCOUNTER FOR FOLLOW-UP SURVEILLANCE OF HEAD AND NECK CANCER: ICD-10-CM

## 2024-10-17 DIAGNOSIS — C32.9 LARYNGEAL CANCER (MULTI): Primary | ICD-10-CM

## 2024-10-17 DIAGNOSIS — R91.8 MULTIPLE LUNG NODULES ON CT: ICD-10-CM

## 2024-10-17 DIAGNOSIS — K11.7 XEROSTOMIA DUE TO RADIOTHERAPY: ICD-10-CM

## 2024-10-17 PROCEDURE — 1036F TOBACCO NON-USER: CPT | Performed by: STUDENT IN AN ORGANIZED HEALTH CARE EDUCATION/TRAINING PROGRAM

## 2024-10-17 PROCEDURE — 99215 OFFICE O/P EST HI 40 MIN: CPT | Performed by: STUDENT IN AN ORGANIZED HEALTH CARE EDUCATION/TRAINING PROGRAM

## 2024-10-17 ASSESSMENT — PAIN SCALES - GENERAL: PAINLEVEL_OUTOF10: 0-NO PAIN

## 2025-01-22 ENCOUNTER — APPOINTMENT (OUTPATIENT)
Dept: OTOLARYNGOLOGY | Facility: HOSPITAL | Age: 55
End: 2025-01-22
Payer: COMMERCIAL

## 2025-01-30 ENCOUNTER — APPOINTMENT (OUTPATIENT)
Dept: PRIMARY CARE | Facility: CLINIC | Age: 55
End: 2025-01-30
Payer: COMMERCIAL

## 2025-02-07 ASSESSMENT — ENCOUNTER SYMPTOMS
COUGH: 0
ADENOPATHY: 0
UNEXPECTED WEIGHT CHANGE: 0
FEVER: 0
STRIDOR: 0
FACIAL SWELLING: 0
SHORTNESS OF BREATH: 0
CHILLS: 0
NAUSEA: 0
VOICE CHANGE: 0
VOMITING: 0
SORE THROAT: 0
FATIGUE: 0
NECK PAIN: 0
APPETITE CHANGE: 0

## 2025-02-07 NOTE — PROGRESS NOTES
Cancer follow up  TSH: Managed by med onc  Chest: Managed by med onc    Chief Complaint   Patient presents with    Follow-up     Cancer follow up     HPI:  Tia Martinez is a 54 y.o. 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 He completed chemo/xrt on 1/30/2020.  He completed lung SBRT on 10/15/20 with Dr. Bhandari. He then underwent left VATS which was negative for persistent cancer. Last seen 7/2024 at which time he had an esophageal dilation. Following with Dr. Burkitt. CT chest scheduled for 4/2025.  He is doing well overall.  Dysphagia remains stable, chronic.  He doesn't feel there's been any change.  Weight is stable.    History:  Dx: T3N0M0 SCCa of the right supraglottis  Dx2: T0N0M1 SCCa of the left lung s/p SBRT 2020  10/19: Hoarseness & dysphagia, scope exam with supraglottic mass  10/30/19: +right supraglottic mass with paraglottic space involvement, no pathologic lymphadenopathy  10/30/19: MBS w/speech +dysphagia with aspiration  11/1/19: S/p triple endoscopy and biopsy +SCCa  11/21/19: CT chest w/o contrast which shows several bilateral pulmonary nodules. A solid 10 mm solid pulmonary  nodule at the lateral aspect of the right upper lobe is concerning for metastatic disease. Further evaluation and attention on short-term follow-up is recommended.   11/27/19: PET/CT which shows thickening of the right vocal cord with significantly increased hypermetabolic activity (max SUV 14.8) extending into the epiglottis with (max SUV 5.3). There is a 0.7 cm right cervical chain zone 2B lymph node with mild hypermetabolic activity (max SUV 3.5). Left upper lobe nodular density measuring 0.8 cm shows mild  hypermetabolic activity (max SUV 2.2).  12/11/19: Started chemo/xrt with Dr. Saldana and Dr. Bhandari   1/30/20: Completed chemo/xrt   4/30/20: 3 month post treatment PET and repeat CT chest shows excellent response with mild FDG avidity at the larynx specifically the  vocal folds, resolution of his cervical lymphadenopathy, stable 7mm left lung nodule.  6/24/20: TSH 2.15  7/17/20: +COVID   8/3/20: Repeat CT chest this shows a left upper lobe nodule which demonstrates interval increase in size and central cavitation. The nodule now measures 1.5 x 1.3 cm, was 0.8 x 0.7 cm. There are multiple additional pulmonary nodules  which are stable in size and appearance compared to the prior exam. For instance, there is a pleural-based nodule in the right lower lobe measuring 5 mm -4 mm nodule in the right lower lobe.   8/25/20: CT guided lung biopsy of the left lung. Path + for SCCa  9/4/20: PET scan +lung nodule   10/15/20: Completed SBRT to L lung with Dr. Bhandari  12/1/20: CT chest which shows interval increase in the size of the left upper lobe lung nodule with now abutment of the adjacent pleura.   1/11/21: PET scan with increase in the DEE lesion, SUV~5, laryngeal SUV5 possibly physiologic  3/1/21: S/p L VATs DEE wedge resection, mediastinal LN dissection, bronch with Dr. Rankin. Path negative for cancer and + reactive changes from prior treatment.   6/10/21: CT chest w/ contrast- stable, no new nodules  1/12/22: CT chest - stable, no new nodules  4/22: CT chest - stable, no new nodules  4/22: TSH- hypothyroid at 5.51.   10/22: TSH 4.8 but T4 normal  10/22: CT chest - stable, no new nodules  1/18: Weight stable  4/11/23: CT chest- stable, no new nodules  10/23: CT chest - stable, no new nodules  4/24: CT chest- stable, no new nodules  7/24: Worsening dysphagia s/p esophageal dilation 15-20  4/25: CT chest scheduled      SH:  Tob: +current smoker. Quit   ETOH: Moderate/social   Here with wife    ROS:  Review of Systems   Constitutional:  Negative for appetite change, chills, fatigue, fever and unexpected weight change.   HENT:  Negative for dental problem, drooling, ear pain, facial swelling, hearing loss, mouth sores, sore throat, tinnitus, trouble swallowing and voice change.     Respiratory:  Negative for cough, shortness of breath and stridor.    Gastrointestinal:  Negative for nausea and vomiting.   Musculoskeletal:  Negative for neck pain.   Hematological:  Negative for adenopathy.   All other systems reviewed and are negative.       PE:  ENT Physical Exam  Constitutional  Appearance: patient appears well-developed, patient is cooperative;  Communication/Voice: Communication comments: +hoarseness (stable), +breathy (stable)  Head and Face  Appearance: head appears normal and face appears normal;  Salivary: glands normal;  Ear  Auricles: right auricle normal; left auricle normal;  Ear Canals: right ear canal normal; left ear canal normal;  Tympanic Membranes: right tympanic membrane normal;  Ear comments: Left TM with small area of thinness but intact, mild cerumen inferiorly bilaterally but able to see TM  Nose  External Nose: nares patent bilaterally; external nose normal;  Internal Nose: nasal mucosa normal; septum normal;  Oral Cavity/Oropharynx  Lips: normal;  Gums: gingiva normal;  Tongue: normal;  Oral mucosa: normal;  OC/OP comments: Uvula deviated to the right s/p tonsillectomy with scars present from tonsillectomy bilaterally  Neck  Neck: radiation changes present;  Thyroid: thyroid normal;  Respiratory  Inspection: breathing unlabored;  Cardiovascular  Inspection: extremities are warm and well perfused;  Neurovestibular  Mental Status: alert and oriented;  Psychiatric: mood normal; affect is appropriate;  Cranial Nerves: cranial nerves intact;       Procedures   PROCEDURE NOTE:  Recommended flexible nasopharyngoscopy.  Risks, benefits, personnel and alternatives were explained.  The patient wished to proceed.  S/he was re-identified.  PROCEDURE:  Flexible nasopharyngoscopy  PREOPERATIVE DIAGNOSIS: Laryngeal cancer surveillance  POSTOPERATIVE DIAGNOSIS: Retroflexed epiglottis, +anterior glottic web - stable, no recurrent masses or lesions  INDICATIONS: Inability to tolerate  mirror exam  PROCEDURE NOTE:  Recommended flexible nasopharyngoscopy.  Risks, benefits, personnel and alternatives were explained.  The patient wished to proceed.  S/he was re-identified.  FINDINGS:  The nasopharynx and oropharynx were normal without any lesions or masses visualized.  No masses or lesions were visualized at the base of tongue, vallecula, epiglottis, aryepiglottic folds, pyriform sinuses, and lateral pharyngeal walls.  See findings above.  True vocal fold movement was normal.  The patient's airway was widely patent with no evidence of obstruction.  Patient tolerated the procedure well, and there were no complications.     ASSESSMENT AND PLAN:  Problem List Items Addressed This Visit       Dysphagia    Current Assessment & Plan     Improved, mild  Chronic, adverse effect of radiation  Doing better since esophageal dilation 7/24  Weight stable         Hoarseness, persistent - Primary    Current Assessment & Plan     Chronic, stable, anterior glottic web  No treatment needed at this time  Not affecting his breathing         Hypothyroidism    Current Assessment & Plan     Managed by med onc - off synthroid currently         Malignant neoplasm of supraglottis (Multi)    Current Assessment & Plan     No evidence of disease on exam or endoscopy  CT chest Scheduled 4/25  Follow up in 1 year         [Metastatic squamous cell carcinoma to lung (Multi)]    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 Luna Mercer MD.     All medical record entries made by the Scribe were at my direction and personally dictated by me, Dr. Luna Mercer. I have  reviewed the chart and agree that the record accurately reflects my personal performance of the history, physical exam, discussion and plan.

## 2025-02-12 ENCOUNTER — OFFICE VISIT (OUTPATIENT)
Dept: OTOLARYNGOLOGY | Facility: HOSPITAL | Age: 55
End: 2025-02-12
Payer: COMMERCIAL

## 2025-02-12 VITALS — HEIGHT: 72 IN | BODY MASS INDEX: 29.8 KG/M2 | TEMPERATURE: 97.9 F | WEIGHT: 220 LBS

## 2025-02-12 DIAGNOSIS — R13.12 OROPHARYNGEAL DYSPHAGIA: ICD-10-CM

## 2025-02-12 DIAGNOSIS — E03.9 ACQUIRED HYPOTHYROIDISM: ICD-10-CM

## 2025-02-12 DIAGNOSIS — K11.7 XEROSTOMIA DUE TO RADIOTHERAPY: ICD-10-CM
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
## 2025-02-12 DIAGNOSIS — C32.1 MALIGNANT NEOPLASM OF SUPRAGLOTTIS (MULTI): ICD-10-CM

## 2025-02-12 DIAGNOSIS — R49.0 HOARSENESS, PERSISTENT: Primary | ICD-10-CM

## 2025-02-12 PROCEDURE — 1036F TOBACCO NON-USER: CPT | Performed by: OTOLARYNGOLOGY

## 2025-02-12 PROCEDURE — 99214 OFFICE O/P EST MOD 30 MIN: CPT | Mod: 25 | Performed by: OTOLARYNGOLOGY

## 2025-02-12 PROCEDURE — 3008F BODY MASS INDEX DOCD: CPT | Performed by: OTOLARYNGOLOGY

## 2025-02-12 PROCEDURE — 99214 OFFICE O/P EST MOD 30 MIN: CPT | Performed by: OTOLARYNGOLOGY

## 2025-02-12 PROCEDURE — 92511 NASOPHARYNGOSCOPY: CPT | Performed by: OTOLARYNGOLOGY

## 2025-02-12 ASSESSMENT — PATIENT HEALTH QUESTIONNAIRE - PHQ9
1. LITTLE INTEREST OR PLEASURE IN DOING THINGS: NOT AT ALL
2. FEELING DOWN, DEPRESSED OR HOPELESS: NOT AT ALL
SUM OF ALL RESPONSES TO PHQ9 QUESTIONS 1 & 2: 0

## 2025-02-12 ASSESSMENT — ENCOUNTER SYMPTOMS: TROUBLE SWALLOWING: 0

## 2025-02-12 ASSESSMENT — PAIN SCALES - GENERAL: PAINLEVEL_OUTOF10: 4

## 2025-02-12 NOTE — ASSESSMENT & PLAN NOTE
Improved, mild  Chronic, adverse effect of radiation  Doing better since esophageal dilation 7/24  Weight stable

## 2025-03-13 ENCOUNTER — APPOINTMENT (OUTPATIENT)
Facility: HOSPITAL | Age: 55
End: 2025-03-13
Payer: COMMERCIAL

## 2025-03-13 ENCOUNTER — HOSPITAL ENCOUNTER (OUTPATIENT)
Dept: RADIOLOGY | Facility: HOSPITAL | Age: 55
Discharge: HOME | End: 2025-03-13
Payer: COMMERCIAL

## 2025-03-13 VITALS
HEIGHT: 72 IN | TEMPERATURE: 96.1 F | WEIGHT: 218.6 LBS | BODY MASS INDEX: 29.61 KG/M2 | HEART RATE: 64 BPM | SYSTOLIC BLOOD PRESSURE: 133 MMHG | OXYGEN SATURATION: 97 % | DIASTOLIC BLOOD PRESSURE: 85 MMHG

## 2025-03-13 DIAGNOSIS — M54.50 CHRONIC LEFT-SIDED LOW BACK PAIN WITHOUT SCIATICA: ICD-10-CM

## 2025-03-13 DIAGNOSIS — M25.552 LEFT HIP PAIN: Primary | ICD-10-CM

## 2025-03-13 DIAGNOSIS — M25.552 LEFT HIP PAIN: ICD-10-CM

## 2025-03-13 DIAGNOSIS — G89.29 CHRONIC LEFT-SIDED LOW BACK PAIN WITHOUT SCIATICA: ICD-10-CM

## 2025-03-13 PROCEDURE — 99213 OFFICE O/P EST LOW 20 MIN: CPT | Performed by: STUDENT IN AN ORGANIZED HEALTH CARE EDUCATION/TRAINING PROGRAM

## 2025-03-13 PROCEDURE — 73502 X-RAY EXAM HIP UNI 2-3 VIEWS: CPT | Mod: LT

## 2025-03-13 PROCEDURE — 72110 X-RAY EXAM L-2 SPINE 4/>VWS: CPT

## 2025-03-13 PROCEDURE — 3008F BODY MASS INDEX DOCD: CPT | Performed by: STUDENT IN AN ORGANIZED HEALTH CARE EDUCATION/TRAINING PROGRAM

## 2025-03-13 RX ORDER — IBUPROFEN 600 MG/1
600 TABLET ORAL 4 TIMES DAILY PRN
Qty: 90 TABLET | Refills: 0 | Status: SHIPPED | OUTPATIENT
Start: 2025-03-13 | End: 2026-03-13

## 2025-03-13 RX ORDER — CYCLOBENZAPRINE HCL 5 MG
5 TABLET ORAL 3 TIMES DAILY PRN
Qty: 30 TABLET | Refills: 2 | Status: SHIPPED | OUTPATIENT
Start: 2025-03-13 | End: 2025-11-08

## 2025-03-13 RX ORDER — ACETAMINOPHEN 325 MG/1
650 TABLET ORAL EVERY 6 HOURS PRN
Qty: 30 TABLET | Refills: 0 | Status: SHIPPED | OUTPATIENT
Start: 2025-03-13 | End: 2025-03-23

## 2025-03-13 ASSESSMENT — ENCOUNTER SYMPTOMS
RHINORRHEA: 0
EYE DISCHARGE: 0
LOSS OF SENSATION IN FEET: 0
ACTIVITY CHANGE: 0
DEPRESSION: 0
OCCASIONAL FEELINGS OF UNSTEADINESS: 0
CONFUSION: 0
ARTHRALGIAS: 1
HEMATURIA: 0
SHORTNESS OF BREATH: 0
UNEXPECTED WEIGHT CHANGE: 0
ABDOMINAL PAIN: 0
DIAPHORESIS: 0
COUGH: 0
CONSTIPATION: 0
DYSURIA: 0
BACK PAIN: 0
NUMBNESS: 0
FEVER: 0
HEADACHES: 0
AGITATION: 0
PHOTOPHOBIA: 0
DIARRHEA: 0
FATIGUE: 0

## 2025-03-13 ASSESSMENT — COLUMBIA-SUICIDE SEVERITY RATING SCALE - C-SSRS
6. HAVE YOU EVER DONE ANYTHING, STARTED TO DO ANYTHING, OR PREPARED TO DO ANYTHING TO END YOUR LIFE?: NO
2. HAVE YOU ACTUALLY HAD ANY THOUGHTS OF KILLING YOURSELF?: NO
1. IN THE PAST MONTH, HAVE YOU WISHED YOU WERE DEAD OR WISHED YOU COULD GO TO SLEEP AND NOT WAKE UP?: NO

## 2025-03-13 ASSESSMENT — PAIN SCALES - GENERAL: PAINLEVEL_OUTOF10: 3

## 2025-03-13 ASSESSMENT — PATIENT HEALTH QUESTIONNAIRE - PHQ9
1. LITTLE INTEREST OR PLEASURE IN DOING THINGS: NOT AT ALL
2. FEELING DOWN, DEPRESSED OR HOPELESS: NOT AT ALL
SUM OF ALL RESPONSES TO PHQ9 QUESTIONS 1 AND 2: 0

## 2025-03-13 NOTE — PROGRESS NOTES
Subjective   Patient ID: Tia Martinez is a 54 y.o. male who presents for Annual Exam (Left side of body is hurting for a month n).     Interval changes:  ***    HPI     Pain in left hip  Noticed it 3 months ago   Achy sometimes radiates to his lower back on left side   Intermittent  Hurts more with a lot of movement especially with standing at work   Denies any trauma, does favor that side to sleep on         Review of Systems   Constitutional:  Negative for activity change, diaphoresis, fatigue, fever and unexpected weight change.   HENT:  Negative for congestion, rhinorrhea and sneezing.    Eyes:  Negative for photophobia and discharge.   Respiratory:  Negative for cough and shortness of breath.    Cardiovascular:  Negative for chest pain.   Gastrointestinal:  Negative for abdominal pain, constipation and diarrhea.   Endocrine: Negative for cold intolerance and heat intolerance.   Genitourinary:  Negative for dysuria and hematuria.   Musculoskeletal:  Positive for arthralgias. Negative for back pain and gait problem.        Pain in left hip    Neurological:  Negative for numbness and headaches.   Psychiatric/Behavioral:  Negative for agitation and confusion.        Objective   /85 (BP Location: Right arm, Patient Position: Sitting, BP Cuff Size: Adult)   Pulse 64   Temp 35.6 °C (96.1 °F) (Temporal)   Ht 1.829 m (6')   Wt 99.2 kg (218 lb 9.6 oz)   SpO2 97%   BMI 29.65 kg/m²     Physical Exam  Constitutional:       Appearance: Normal appearance.   HENT:      Head: Normocephalic.   Eyes:      Extraocular Movements: Extraocular movements intact.      Pupils: Pupils are equal, round, and reactive to light.   Cardiovascular:      Rate and Rhythm: Normal rate and regular rhythm.      Pulses: Normal pulses.      Heart sounds: Normal heart sounds.   Pulmonary:      Effort: Pulmonary effort is normal.      Breath sounds: Normal breath sounds.   Abdominal:      Palpations: Abdomen is soft.   Musculoskeletal:          General: Normal range of motion.   Skin:     General: Skin is warm and dry.      Capillary Refill: Capillary refill takes less than 2 seconds.   Neurological:      Mental Status: He is alert and oriented to person, place, and time.       Assessment/Plan   {Assess/PlanSmartLinks:79248}               OVERALL PLAN:  [ ] ***        Note: This documentaion was entered into the electronic medical record using Humans[employer] medical dictation software, and was not necessarily edited thereafter. Please excuse any errors of spelling or grammar.

## 2025-03-31 ASSESSMENT — ENCOUNTER SYMPTOMS
CHILLS: 0
VOMITING: 0
TROUBLE SWALLOWING: 0
ABDOMINAL PAIN: 0
DIARRHEA: 0
SHORTNESS OF BREATH: 0
HEADACHES: 0
FEVER: 0
NAUSEA: 0
ARTHRALGIAS: 0
COUGH: 0
CONSTIPATION: 0
LIGHT-HEADEDNESS: 0
NUMBNESS: 0
SORE THROAT: 0
LEG SWELLING: 0

## 2025-03-31 NOTE — PROGRESS NOTES
Patient ID: Tia Martinez is a 54 y.o. male.  Diagnosis:  Squamous Cell Carcinoma of supraglottic larynx, P16 Negative  Stagin/2019: cT3 cN0 cMx, Stage III   2020: T0 N0 M1 with mets to lung  Date of Diagnosis: 2019    Providers:  ENT Surgeon: Dr. Luna Mercer  MedOn: Dr. Rachelle Saldana --> Dr. Kyunghee Burkitt, X. Katherine Feng ([place])  RadOnc: Dr. Luigi Bhandari    Prior Therapy  2019 - 2020: Concurrent chemoradiation HD Cisplatin (100mg/m2) x1 and changed to weekly dosing with Cisplatin (40mg/m2) and 70gy RT in 35fx  10/5 - 10/15/2020: 50gy SBRT to left upper lobe lung nodule metastatic from Squamous Cell Carcinoma of Larynx.     Sites of Disease  DEE      ONCOLOGIC HISTORY  - 2019 Presented to Dr. Mercer  - Enrolled on the DRINK study. Cycle 1 of HD cisplatin was complicated by MIRTHA with Creat 2.3 due to dehydration. He received IVF 3x a week in [facility] the week after chemo with good improvement. When he presented for 2nd dose HD Cisplatin his ANC was below  parameters for treatment. Given low ANC and elevated Creatinine after HD Cis, he was changed to weekly dosing which he received on 19 when his counts recovered.   - 20 post treatment PET/CT showed Marked interval decrease in metabolic activity within the larynx consistent with response of patient's known squamous cell carcinoma to treatment. Mild increased  activity is seen in the area of the vocal cords which could be related to phonation versus residual tumor. Resolution of previously seen hypermetabolic cervical lymphadenopathy. Stable pulmonary nodule within the left upper lobe with mild metabolic activity,  nonspecific.      Lung nodule:   - 8/3/20 CT chest w/ contrast: showed interval increase in size of the pulmonary nodule in the left upper lobe, which now demonstrates central cavitation. Findings are concerning for potential metastatic disease.  - 20: CT guided lung biopsy that confirmed mets.   - 10/15/20:  Completed 50 gy SBRT to nodules.    - 20 CT chest w/ con reviewed with Dr. Bhandari. There was  Interval increase in size of a left upper lobe cavitating pulmonary nodule with now abutment of the adjacent pleura. After discussion with Rad onc, it maybe radiation related fibrosis and it  was recommended for PET   - 21 PET/CT showed continued increase activity in left upper lobe mass w/ significant interval increase in size and hypermetabolic activity  - 3/1/21: Left upper lobe wedge resection. Pathology did not show evidence of cancer--only inflammation. Discussed the results with the  patient and plan for surveillance  - Surveillance CT Chest has been stable since     Past Medical History:   Past Medical History:  No date: Fracture of unspecified part of scapula, unspecified   shoulder, initial encounter for closed fracture      Comment:  Scapula fracture  10/19/2022: Personal history of malignant neoplasm of larynx      Comment:  History of malignant neoplasm of larynx   Surgical History:    Past Surgical History:   Procedure Laterality Date    CT GUIDED PERCUTANEOUS BIOPSY LUNG  2020    CT GUIDED PERCUTANEOUS BIOPSY LUNG 2020 [facility]    OTHER SURGICAL HISTORY  2021    Lung wedge resection    TONSILLECTOMY  11/15/2016    Tonsillectomy With Adenoidectomy      Family History:    Family History   Problem Relation Name Age of Onset    Heart attack Mother      Thyroid disease Mother      Diabetes Father      Lung cancer Father      Heart failure Brother      Diabetes Mother's Sister       Family Oncology History:    Cancer-related family history includes Lung cancer in his father.  Social History:    Social History     Tobacco Use    Smoking status: Former     Current packs/day: 0.00     Types: Cigarettes     Quit date:      Years since quittin.3    Smokeless tobacco: Never   Substance Use Topics    Alcohol use: Yes     Comment: socially    Drug use: Never      Subjective   Interval  History  Patient is 4 years 6 months from last treatment, he presents today for follow up and review of surveillance CT Chest.    10/4/24 CT chest w IV contrast showed stable post treatment/surgical changes from prior left upper lobe wedge resection with nodular thickening/consolidation along the suture line. Has stable scattered pulmonary nodules as described. No new or enlarging nodules.    He's feeling well overall.   He had an esophageal dilation on 7/30/24 and his swallowing has been good ever since.   Has stable muscle tightness in the neck when he eats/chews or yawn. This is chronic but occurs infrequently.   He continues to have dry mouth that is well managed with water, taste is back to normal. He has lost weight since last visit.   Denies tinnitus and peripheral neuropathy.     ROS  Review of Systems   Constitutional:  Negative for chills and fever.   HENT:   Negative for hearing loss, lump/mass, mouth sores, nosebleeds, sore throat, tinnitus and trouble swallowing.    Respiratory:  Negative for cough and shortness of breath.    Cardiovascular:  Negative for chest pain and leg swelling.   Gastrointestinal:  Negative for abdominal pain, constipation, diarrhea, nausea and vomiting.   Musculoskeletal:  Negative for arthralgias.   Skin:  Negative for rash.   Neurological:  Negative for headaches, light-headedness and numbness.       Allergies  No Known Allergies     Medications  Current Outpatient Medications   Medication Instructions    cyclobenzaprine (FLEXERIL) 5 mg, oral, 3 times daily PRN    ibuprofen 600 mg, oral, 4 times daily PRN      Objective   VS: /59   Pulse 55   Temp 36 °C (96.8 °F) (Core)   Resp 20   Wt 98 kg (216 lb 0.8 oz)   SpO2 98%   BMI 29.30 kg/m²   Weight:   Wt Readings from Last 5 Encounters:   04/23/25 98 kg (216 lb 0.8 oz)   03/13/25 99.2 kg (218 lb 9.6 oz)   02/12/25 99.8 kg (220 lb)   10/17/24 96.1 kg (211 lb 13.8 oz)   08/27/24 83.5 kg (184 lb)     Physical  Exam  Constitutional:       Appearance: Normal appearance. He is well-developed.   HENT:      Head: Normocephalic and atraumatic.      Right Ear: External ear normal. No tenderness.      Left Ear: External ear normal. No tenderness.      Nose: Nose normal.      Mouth/Throat:      Mouth: Mucous membranes are moist. No injury or oral lesions.      Tongue: No lesions.      Pharynx: Oropharynx is clear.   Eyes:      Extraocular Movements: Extraocular movements intact.      Conjunctiva/sclera: Conjunctivae normal.      Pupils: Pupils are equal, round, and reactive to light.   Neck:      Thyroid: No thyroid mass.   Cardiovascular:      Rate and Rhythm: Normal rate and regular rhythm.   Pulmonary:      Effort: Pulmonary effort is normal. No respiratory distress.      Breath sounds: Normal breath sounds.   Abdominal:      General: Bowel sounds are normal. There is no distension or abdominal bruit.      Palpations: Abdomen is soft. There is no mass.      Tenderness: There is no abdominal tenderness.   Musculoskeletal:         General: Normal range of motion.      Cervical back: Normal range of motion and neck supple. No signs of trauma. Normal range of motion.      Right lower leg: No edema.      Left lower leg: No edema.   Lymphadenopathy:      Cervical: No cervical adenopathy.      Upper Body:      Right upper body: No axillary adenopathy.      Left upper body: No axillary adenopathy.   Skin:     General: Skin is warm and dry.      Findings: No lesion or rash.   Neurological:      General: No focal deficit present.      Mental Status: He is alert and oriented to person, place, and time.      Gait: Gait is intact.   Psychiatric:         Mood and Affect: Mood and affect normal.         Behavior: Behavior is cooperative.     Diagnostic Results     Labs  Labs from 10/7/24 reviewed and are at baseline.   Results from last 7 days   Lab Units 04/17/25  1000   WBC AUTO x10*3/uL 4.7   HEMOGLOBIN g/dL 14.4   HEMATOCRIT % 43.4    PLATELETS AUTO x10*3/uL 166   NEUTROS ABS x10*3/uL 2.41   LYMPHS ABS AUTO x10*3/uL 1.60   MONOS ABS AUTO x10*3/uL 0.44   EOS ABS AUTO x10*3/uL 0.16   NEUTROS PCT AUTO % 51.8   LYMPHS PCT AUTO % 34.3   MONOS PCT AUTO % 9.4   EOS PCT AUTO % 3.4     Results from last 7 days   Lab Units 04/17/25  1000   GLUCOSE mg/dL 96   SODIUM mmol/L 139   POTASSIUM mmol/L 3.8   CHLORIDE mmol/L 102   CO2 mmol/L 30   BUN mg/dL 20   CREATININE mg/dL 1.07   EGFR mL/min/1.73m*2 83   CALCIUM mg/dL 9.9   ALBUMIN g/dL 4.6   PROTEIN TOTAL g/dL 7.4   BILIRUBIN TOTAL mg/dL 0.5   ALK PHOS U/L 61   ALT U/L 29   AST U/L 20     Results from last 7 days   Lab Units 04/17/25  1000   TSH mIU/L 5.91*   FREE T4 ng/dL 1.08               Image  10/4/2023 CT chest w IV contrast  Impression:   1.  Evolving postsurgical/post radiation changes of the upper lobe, with mild decrease in conspicuity of soft tissue nodularity along the surgical suture margins as described above, when compared to recent prior CT from 04/10/2023. Continued attention on follow-up imaging is recommended.   2. Few small bilateral noncalcified pulmonary nodules, which are stable dating back to 10/01/2021. No new or enlarging pulmonary nodules.  3. Additional stable findings as above.      4/5/2024 CT chest w IV contrast  Impression:   1.  Stable postsurgical changes related to left upper lobe wedge resection with nodular thickening along the left upper lobe suture line, unchanged.   2. Scattered pulmonary nodules measuring up to 5 mm, stable. No new or enlarging nodule.         Assessment/Plan   ASSESSMENT  Tia Martinez is a 54 y.o. male w/ stage III cT3 cN0 cMx supraglottic larynx cancer, PDL-1 CPS 5 receiving definitive chemoradiation with high dose cisplatin 100mg/m2  started on 12/11/19. Transition to weekly Cisplatin dosing after 1 cycle of HD Cis, completed CRT on 1/30/20. Enrolled on DRINK study, AKA [employer] study. Post treatment PET/CT showed good response in head & neck  with mild metabolic activity in DEE. Later  found to have DEE lung mets confirmed by biopsy and completed SBRT to DEE lung mass on 10/15/20. However continued to have increased metabolic activity on PET/CT and completed DEE wedge resection  on 3/1/21 with path showing only inflammation. Patient has since been followed with surveillance CT chest with contrast.      # Supraglottic larynx cancer with mets to lung  - Patient is 5 years from last treatment. Reviewed 4/17/25 CT Chest with patient, continue to show stability in lung nodules. He's eating/drinking with no issues, he's in his usual state of health. He has established with a PCP. Disucss that he should brush teeth BID and establish with a dentist for at least twice a year dental exams and cleanings.  He will have surveillance scan again in 6 months.     # Elevated TSH  - 4/9/24: TSH 6.9. Has had previously mild elevations in TSH that have returned to normal range. He's asymptomatic for hypothyroidism.   - 10/4/24: TSH 3.63. Will continue to monitor  - 4/17/25 TSH 5.91. will repeat TSH in 2 months.     # Steatosis  - Notes on 10/10/22 CT Chest. Patient endorse eating greasy meals, drinking 12 pack of beer  - Advised pt to establish PCP to discuss lifestyle modifications decrease high fat food and incorporate vegetables and protein in to diet. Decrease alcohol intake.  - 10/12/23: Patient has loss weight but continue to endorse not eating very health and still has not established with a PCP. Encouraged patient to go on 's website to schedule with a PCP online.      PLAN  -- RTC 6 months w/ CT Chest w/ contast on 10/24/25 , labs same day. FUV w/ medonc on 10/29/25  -- FUV w/ ENT as scheduled, around 02/2026  -- Patient to establish w/ dentist

## 2025-04-06 DIAGNOSIS — M54.50 CHRONIC LEFT-SIDED LOW BACK PAIN WITHOUT SCIATICA: ICD-10-CM

## 2025-04-06 DIAGNOSIS — G89.29 CHRONIC LEFT-SIDED LOW BACK PAIN WITHOUT SCIATICA: ICD-10-CM

## 2025-04-06 DIAGNOSIS — M25.552 LEFT HIP PAIN: ICD-10-CM

## 2025-04-07 RX ORDER — IBUPROFEN 600 MG/1
600 TABLET ORAL 4 TIMES DAILY PRN
Qty: 90 TABLET | Refills: 0 | Status: SHIPPED | OUTPATIENT
Start: 2025-04-07 | End: 2026-04-07

## 2025-04-16 DIAGNOSIS — C32.9 MALIGNANT NEOPLASM OF LARYNX, UNSPECIFIED: Primary | ICD-10-CM

## 2025-04-17 ENCOUNTER — LAB (OUTPATIENT)
Dept: LAB | Facility: HOSPITAL | Age: 55
End: 2025-04-17
Payer: COMMERCIAL

## 2025-04-17 ENCOUNTER — HOSPITAL ENCOUNTER (OUTPATIENT)
Dept: RADIOLOGY | Facility: HOSPITAL | Age: 55
Discharge: HOME | End: 2025-04-17
Payer: COMMERCIAL

## 2025-04-17 DIAGNOSIS — C78.00 SECONDARY SQUAMOUS CELL CARCINOMA OF LUNG, UNSPECIFIED LATERALITY: ICD-10-CM

## 2025-04-17 DIAGNOSIS — C32.9 LARYNGEAL CANCER (MULTI): ICD-10-CM

## 2025-04-17 DIAGNOSIS — C32.9 MALIGNANT NEOPLASM OF LARYNX, UNSPECIFIED: ICD-10-CM

## 2025-04-17 LAB
ALBUMIN SERPL BCP-MCNC: 4.6 G/DL (ref 3.4–5)
ALP SERPL-CCNC: 61 U/L (ref 33–120)
ALT SERPL W P-5'-P-CCNC: 29 U/L (ref 10–52)
ANION GAP SERPL CALC-SCNC: 11 MMOL/L (ref 10–20)
AST SERPL W P-5'-P-CCNC: 20 U/L (ref 9–39)
BASOPHILS # BLD AUTO: 0.04 X10*3/UL (ref 0–0.1)
BASOPHILS NFR BLD AUTO: 0.9 %
BILIRUB SERPL-MCNC: 0.5 MG/DL (ref 0–1.2)
BUN SERPL-MCNC: 20 MG/DL (ref 6–23)
CALCIUM SERPL-MCNC: 9.9 MG/DL (ref 8.6–10.3)
CHLORIDE SERPL-SCNC: 102 MMOL/L (ref 98–107)
CO2 SERPL-SCNC: 30 MMOL/L (ref 21–32)
CREAT SERPL-MCNC: 1.07 MG/DL (ref 0.5–1.3)
EGFRCR SERPLBLD CKD-EPI 2021: 83 ML/MIN/1.73M*2
EOSINOPHIL # BLD AUTO: 0.16 X10*3/UL (ref 0–0.7)
EOSINOPHIL NFR BLD AUTO: 3.4 %
ERYTHROCYTE [DISTWIDTH] IN BLOOD BY AUTOMATED COUNT: 13 % (ref 11.5–14.5)
GLUCOSE SERPL-MCNC: 96 MG/DL (ref 74–99)
HCT VFR BLD AUTO: 43.4 % (ref 41–52)
HGB BLD-MCNC: 14.4 G/DL (ref 13.5–17.5)
IMM GRANULOCYTES # BLD AUTO: 0.01 X10*3/UL (ref 0–0.7)
IMM GRANULOCYTES NFR BLD AUTO: 0.2 % (ref 0–0.9)
LYMPHOCYTES # BLD AUTO: 1.6 X10*3/UL (ref 1.2–4.8)
LYMPHOCYTES NFR BLD AUTO: 34.3 %
MCH RBC QN AUTO: 29 PG (ref 26–34)
MCHC RBC AUTO-ENTMCNC: 33.2 G/DL (ref 32–36)
MCV RBC AUTO: 88 FL (ref 80–100)
MONOCYTES # BLD AUTO: 0.44 X10*3/UL (ref 0.1–1)
MONOCYTES NFR BLD AUTO: 9.4 %
NEUTROPHILS # BLD AUTO: 2.41 X10*3/UL (ref 1.2–7.7)
NEUTROPHILS NFR BLD AUTO: 51.8 %
NRBC BLD-RTO: 0 /100 WBCS (ref 0–0)
PLATELET # BLD AUTO: 166 X10*3/UL (ref 150–450)
POTASSIUM SERPL-SCNC: 3.8 MMOL/L (ref 3.5–5.3)
PROT SERPL-MCNC: 7.4 G/DL (ref 6.4–8.2)
RBC # BLD AUTO: 4.96 X10*6/UL (ref 4.5–5.9)
SODIUM SERPL-SCNC: 139 MMOL/L (ref 136–145)
T4 FREE SERPL-MCNC: 1.08 NG/DL (ref 0.78–1.48)
TSH SERPL-ACNC: 5.91 MIU/L (ref 0.44–3.98)
WBC # BLD AUTO: 4.7 X10*3/UL (ref 4.4–11.3)

## 2025-04-17 PROCEDURE — 84439 ASSAY OF FREE THYROXINE: CPT

## 2025-04-17 PROCEDURE — 36415 COLL VENOUS BLD VENIPUNCTURE: CPT

## 2025-04-17 PROCEDURE — 85025 COMPLETE CBC W/AUTO DIFF WBC: CPT

## 2025-04-17 PROCEDURE — 71260 CT THORAX DX C+: CPT

## 2025-04-17 PROCEDURE — 84443 ASSAY THYROID STIM HORMONE: CPT

## 2025-04-17 PROCEDURE — 2550000001 HC RX 255 CONTRASTS: Performed by: STUDENT IN AN ORGANIZED HEALTH CARE EDUCATION/TRAINING PROGRAM

## 2025-04-17 PROCEDURE — 80053 COMPREHEN METABOLIC PANEL: CPT

## 2025-04-17 RX ADMIN — IOHEXOL 75 ML: 350 INJECTION, SOLUTION INTRAVENOUS at 11:06

## 2025-04-23 ENCOUNTER — OFFICE VISIT (OUTPATIENT)
Dept: HEMATOLOGY/ONCOLOGY | Facility: HOSPITAL | Age: 55
End: 2025-04-23
Payer: COMMERCIAL

## 2025-04-23 VITALS
OXYGEN SATURATION: 98 % | DIASTOLIC BLOOD PRESSURE: 59 MMHG | BODY MASS INDEX: 29.3 KG/M2 | SYSTOLIC BLOOD PRESSURE: 125 MMHG | RESPIRATION RATE: 20 BRPM | WEIGHT: 216.05 LBS | HEART RATE: 55 BPM | TEMPERATURE: 96.8 F

## 2025-04-23 DIAGNOSIS — C78.00 SECONDARY SQUAMOUS CELL CARCINOMA OF LUNG, UNSPECIFIED LATERALITY: Primary | ICD-10-CM

## 2025-04-23 DIAGNOSIS — R79.89 ELEVATED TSH: ICD-10-CM

## 2025-04-23 DIAGNOSIS — Z85.89 ENCOUNTER FOR FOLLOW-UP SURVEILLANCE OF HEAD AND NECK CANCER: ICD-10-CM

## 2025-04-23 DIAGNOSIS — K11.7 XEROSTOMIA DUE TO RADIOTHERAPY: ICD-10-CM
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
## 2025-04-23 DIAGNOSIS — R91.8 MULTIPLE LUNG NODULES ON CT: ICD-10-CM

## 2025-04-23 DIAGNOSIS — Z08 ENCOUNTER FOR FOLLOW-UP SURVEILLANCE OF HEAD AND NECK CANCER: ICD-10-CM

## 2025-04-23 DIAGNOSIS — C32.9 LARYNGEAL CANCER (MULTI): ICD-10-CM

## 2025-04-23 PROCEDURE — 99215 OFFICE O/P EST HI 40 MIN: CPT | Performed by: STUDENT IN AN ORGANIZED HEALTH CARE EDUCATION/TRAINING PROGRAM

## 2025-04-23 PROCEDURE — 1036F TOBACCO NON-USER: CPT | Performed by: STUDENT IN AN ORGANIZED HEALTH CARE EDUCATION/TRAINING PROGRAM

## 2025-04-23 ASSESSMENT — PAIN SCALES - GENERAL: PAINLEVEL_OUTOF10: 0-NO PAIN

## 2025-04-23 NOTE — PATIENT INSTRUCTIONS
Please come back to [facility] to recheck your TSH around 6/23/25.   Your next set of CT Chest will be around 10/24/25. Please get labs before your scan.   I will see you 1 week after your scan for scan review.

## 2025-06-11 ENCOUNTER — TELEPHONE (OUTPATIENT)
Dept: HEMATOLOGY/ONCOLOGY | Facility: HOSPITAL | Age: 55
End: 2025-06-11
Payer: COMMERCIAL

## 2025-06-11 NOTE — TELEPHONE ENCOUNTER
Called pt because of recent message from phone  CRM message wanting a scan for prostate. VINCENZO Novoa PA-C does not oversee prostate cancers. Wanted to see what pt concern is and address needs from there.

## 2025-09-02 ENCOUNTER — APPOINTMENT (OUTPATIENT)
Facility: HOSPITAL | Age: 55
End: 2025-09-02
Payer: COMMERCIAL

## 2025-09-02 ENCOUNTER — LAB (OUTPATIENT)
Dept: LAB | Facility: HOSPITAL | Age: 55
End: 2025-09-02
Payer: COMMERCIAL

## 2025-09-02 DIAGNOSIS — C78.00 SECONDARY SQUAMOUS CELL CARCINOMA OF LUNG, UNSPECIFIED LATERALITY: ICD-10-CM

## 2025-09-02 DIAGNOSIS — Z08 ENCOUNTER FOR FOLLOW-UP SURVEILLANCE OF HEAD AND NECK CANCER: ICD-10-CM

## 2025-09-02 DIAGNOSIS — Z85.89 ENCOUNTER FOR FOLLOW-UP SURVEILLANCE OF HEAD AND NECK CANCER: ICD-10-CM

## 2025-09-02 DIAGNOSIS — C32.9 LARYNGEAL CANCER (MULTI): ICD-10-CM

## 2025-09-02 LAB
T4 FREE SERPL-MCNC: 1.18 NG/DL (ref 0.78–1.48)
TSH SERPL-ACNC: 6.46 MIU/L (ref 0.44–3.98)

## 2025-09-02 PROCEDURE — 84443 ASSAY THYROID STIM HORMONE: CPT

## 2025-09-02 PROCEDURE — 36415 COLL VENOUS BLD VENIPUNCTURE: CPT

## 2025-09-02 PROCEDURE — 84439 ASSAY OF FREE THYROXINE: CPT

## 2025-09-02 ASSESSMENT — PAIN SCALES - GENERAL: PAINLEVEL_OUTOF10: 0-NO PAIN

## 2025-09-03 LAB
HOLD SPECIMEN: NORMAL
HOLD SPECIMEN: NORMAL

## 2025-09-08 ENCOUNTER — APPOINTMENT (OUTPATIENT)
Facility: HOSPITAL | Age: 55
End: 2025-09-08
Payer: COMMERCIAL

## (undated) DEVICE — VALVE, BIOPSY, BRONCHOSCOPE, DISPOSABLE, STERILE

## (undated) DEVICE — ADAPTER, WATER BOTTLE, SMART CAP, 140/160/180 SERIES

## (undated) DEVICE — BOWL, BASIN, 32 OZ, STERILE

## (undated) DEVICE — COVER, TABLE, 44 X 75 IN, DISPOSABLE, LF, STERILE

## (undated) DEVICE — VALVE, DEFENDO, 5 PCS BUTTON SET, SINGLE USE

## (undated) DEVICE — REST, HEAD, BAGEL, 9 IN

## (undated) DEVICE — MARKER, SURGICAL, SKIN, STANDARD, W/RULER, LF

## (undated) DEVICE — MANIFOLD, 4 PORT NEPTUNE STANDARD

## (undated) DEVICE — DRESSING, GAUZE, 16 PLY, 4 X 4 IN, STERILE

## (undated) DEVICE — COVER, CART, 45 X 27 X 48 IN, CLEAR

## (undated) DEVICE — TUBING, SUCTION, CONNECTING, STERILE 0.25 X 120 IN., LF

## (undated) DEVICE — SYRINGE, 10 CC, LUER LOCK

## (undated) DEVICE — VALVE, SUCTION

## (undated) DEVICE — Device

## (undated) DEVICE — CATHETER, IV, INSYTE, AUTOGUARD BC PNK, 20 GA X 1.16 IN

## (undated) DEVICE — MARKER, SKIN, DUAL TIP, W/RULER AND LABEL